# Patient Record
Sex: FEMALE | Race: WHITE | Employment: OTHER | URBAN - METROPOLITAN AREA
[De-identification: names, ages, dates, MRNs, and addresses within clinical notes are randomized per-mention and may not be internally consistent; named-entity substitution may affect disease eponyms.]

---

## 2017-02-10 ENCOUNTER — TELEPHONE (OUTPATIENT)
Dept: INTERNAL MEDICINE CLINIC | Facility: CLINIC | Age: 62
End: 2017-02-10

## 2017-02-10 ENCOUNTER — OFFICE VISIT (OUTPATIENT)
Dept: INTERNAL MEDICINE CLINIC | Facility: CLINIC | Age: 62
End: 2017-02-10

## 2017-02-10 VITALS
HEIGHT: 60 IN | DIASTOLIC BLOOD PRESSURE: 82 MMHG | SYSTOLIC BLOOD PRESSURE: 126 MMHG | HEART RATE: 106 BPM | TEMPERATURE: 98 F | BODY MASS INDEX: 29.18 KG/M2 | OXYGEN SATURATION: 98 % | WEIGHT: 148.63 LBS | RESPIRATION RATE: 16 BRPM

## 2017-02-10 DIAGNOSIS — R79.89 LOW VITAMIN D LEVEL: ICD-10-CM

## 2017-02-10 DIAGNOSIS — Z99.89 OSA ON CPAP: ICD-10-CM

## 2017-02-10 DIAGNOSIS — G47.33 OSA (OBSTRUCTIVE SLEEP APNEA): Primary | ICD-10-CM

## 2017-02-10 DIAGNOSIS — Z12.31 ENCOUNTER FOR SCREENING MAMMOGRAM FOR BREAST CANCER: ICD-10-CM

## 2017-02-10 DIAGNOSIS — Z12.4 ENCOUNTER FOR SCREENING FOR CERVICAL CANCER: ICD-10-CM

## 2017-02-10 DIAGNOSIS — G47.33 OSA ON CPAP: ICD-10-CM

## 2017-02-10 DIAGNOSIS — E78.2 MIXED HYPERLIPIDEMIA: ICD-10-CM

## 2017-02-10 DIAGNOSIS — I69.30 HISTORY OF CVA WITH RESIDUAL DEFICIT: ICD-10-CM

## 2017-02-10 DIAGNOSIS — I10 ESSENTIAL HYPERTENSION: ICD-10-CM

## 2017-02-10 DIAGNOSIS — R73.03 PRE-DIABETES: ICD-10-CM

## 2017-02-10 DIAGNOSIS — Z00.00 ROUTINE GENERAL MEDICAL EXAMINATION AT A HEALTH CARE FACILITY: Primary | ICD-10-CM

## 2017-02-10 PROBLEM — Z86.32 HISTORY OF GESTATIONAL DIABETES MELLITUS (GDM): Status: ACTIVE | Noted: 2017-02-10

## 2017-02-10 PROCEDURE — 99214 OFFICE O/P EST MOD 30 MIN: CPT | Performed by: INTERNAL MEDICINE

## 2017-02-10 PROCEDURE — 99386 PREV VISIT NEW AGE 40-64: CPT | Performed by: INTERNAL MEDICINE

## 2017-02-10 PROCEDURE — 87624 HPV HI-RISK TYP POOLED RSLT: CPT | Performed by: INTERNAL MEDICINE

## 2017-02-10 PROCEDURE — 88175 CYTOPATH C/V AUTO FLUID REDO: CPT | Performed by: INTERNAL MEDICINE

## 2017-02-10 RX ORDER — DIAZEPAM 5 MG/1
5 TABLET ORAL EVERY 6 HOURS PRN
Qty: 30 TABLET | Refills: 0 | Status: SHIPPED | OUTPATIENT
Start: 2017-02-10 | End: 2018-11-20

## 2017-02-10 RX ORDER — ZOLPIDEM TARTRATE 5 MG/1
5 TABLET ORAL NIGHTLY PRN
Qty: 30 TABLET | Refills: 0 | Status: SHIPPED | OUTPATIENT
Start: 2017-02-10 | End: 2017-12-08

## 2017-02-10 RX ORDER — ROSUVASTATIN CALCIUM 10 MG/1
10 TABLET, COATED ORAL DAILY
Qty: 90 TABLET | Refills: 3 | Status: SHIPPED | OUTPATIENT
Start: 2017-02-10 | End: 2018-06-18

## 2017-02-10 RX ORDER — DOXEPIN HYDROCHLORIDE 50 MG/1
1 CAPSULE ORAL DAILY
COMMUNITY

## 2017-02-10 RX ORDER — VALSARTAN 80 MG/1
80 TABLET ORAL DAILY
Qty: 90 TABLET | Refills: 3 | Status: SHIPPED | OUTPATIENT
Start: 2017-02-10 | End: 2018-06-18

## 2017-02-10 NOTE — TELEPHONE ENCOUNTER
Per Dr. Justice Olivas. Pt needs CPAP equipment ordered from [facility] insurance, brought SS records w/her.

## 2017-02-10 NOTE — PROGRESS NOTES
HPI:   Michelle Recinos is a 62year old female who presents for a complete physical exam. .    HTN  Hypercholesterolemia    Reviewed records in [facility] and those she brought w/her from [facility]    She had been previously been going to [facility] and has a home MG Oral Tab Take 10 mg by mouth daily. Disp:  Rfl:    Fluticasone Furoate (VERAMYST) 27.5 MCG/SPRAY Nasal Suspension 2 sprays by Nasal route as needed. Disp:  Rfl:    Levocetirizine Dihydrochloride (XYZAL) 5 MG Oral Tab Take 5 mg by mouth every evening. mmHg  Pulse 106  Temp(Src) 97.9 °F (36.6 °C) (Oral)  Resp 16  Ht 60\"  Wt 148 lb 9.6 oz  BMI 29.02 kg/m2  SpO2 98%  Body mass index is 29.02 kg/(m^2).    GENERAL: well developed, well nourished,in no apparent distress  SKIN: no rashes,no suspicious lesions now  Essential hypertension- controlled, check labs and refill meds  History of cva with residual deficit- RF control, she had been on ERT just prior to her event so may have been a hypercoag state, no other etiology found for the event.  She does have some

## 2017-02-11 ENCOUNTER — MED REC SCAN ONLY (OUTPATIENT)
Dept: INTERNAL MEDICINE CLINIC | Facility: CLINIC | Age: 62
End: 2017-02-11

## 2017-02-13 ENCOUNTER — LAB ENCOUNTER (OUTPATIENT)
Dept: LAB | Age: 62
End: 2017-02-13
Attending: INTERNAL MEDICINE
Payer: COMMERCIAL

## 2017-02-13 DIAGNOSIS — E55.9 AVITAMINOSIS D: Primary | ICD-10-CM

## 2017-02-13 DIAGNOSIS — R73.03 PRE-DIABETES: ICD-10-CM

## 2017-02-13 DIAGNOSIS — Z00.00 ROUTINE GENERAL MEDICAL EXAMINATION AT A HEALTH CARE FACILITY: ICD-10-CM

## 2017-02-13 LAB
25-HYDROXYVITAMIN D (TOTAL): 32.1 NG/ML (ref 30–100)
ALBUMIN SERPL-MCNC: 4.1 G/DL (ref 3.5–4.8)
ALP LIVER SERPL-CCNC: 62 U/L (ref 46–118)
ALT SERPL-CCNC: 35 U/L (ref 14–54)
AST SERPL-CCNC: 20 U/L (ref 15–41)
BASOPHILS # BLD AUTO: 0.05 X10(3) UL (ref 0–0.1)
BASOPHILS NFR BLD AUTO: 0.9 %
BILIRUB SERPL-MCNC: 0.5 MG/DL (ref 0.1–2)
BUN BLD-MCNC: 12 MG/DL (ref 8–20)
CALCIUM BLD-MCNC: 8.9 MG/DL (ref 8.3–10.3)
CHLORIDE: 102 MMOL/L (ref 101–111)
CHOLEST SMN-MCNC: 195 MG/DL (ref ?–200)
CO2: 30 MMOL/L (ref 22–32)
CREAT BLD-MCNC: 0.75 MG/DL (ref 0.55–1.02)
EOSINOPHIL # BLD AUTO: 0.21 X10(3) UL (ref 0–0.3)
EOSINOPHIL NFR BLD AUTO: 3.7 %
ERYTHROCYTE [DISTWIDTH] IN BLOOD BY AUTOMATED COUNT: 12.4 % (ref 11.5–16)
EST. AVERAGE GLUCOSE BLD GHB EST-MCNC: 137 MG/DL (ref 68–126)
GLUCOSE BLD-MCNC: 108 MG/DL (ref 70–99)
HBA1C MFR BLD HPLC: 6.4 % (ref ?–5.7)
HCT VFR BLD AUTO: 41.4 % (ref 34–50)
HDLC SERPL-MCNC: 68 MG/DL (ref 45–?)
HDLC SERPL: 2.87 {RATIO} (ref ?–4.44)
HGB BLD-MCNC: 13.3 G/DL (ref 12–16)
IMMATURE GRANULOCYTE COUNT: 0.02 X10(3) UL (ref 0–1)
IMMATURE GRANULOCYTE RATIO %: 0.4 %
LDLC SERPL CALC-MCNC: 89 MG/DL (ref ?–130)
LYMPHOCYTES # BLD AUTO: 1.82 X10(3) UL (ref 0.9–4)
LYMPHOCYTES NFR BLD AUTO: 31.9 %
M PROTEIN MFR SERPL ELPH: 7.6 G/DL (ref 6.1–8.3)
MCH RBC QN AUTO: 29.8 PG (ref 27–33.2)
MCHC RBC AUTO-ENTMCNC: 32.1 G/DL (ref 31–37)
MCV RBC AUTO: 92.6 FL (ref 81–100)
MONOCYTES # BLD AUTO: 0.49 X10(3) UL (ref 0.1–0.6)
MONOCYTES NFR BLD AUTO: 8.6 %
NEUTROPHIL ABS PRELIM: 3.11 X10 (3) UL (ref 1.3–6.7)
NEUTROPHILS # BLD AUTO: 3.11 X10(3) UL (ref 1.3–6.7)
NEUTROPHILS NFR BLD AUTO: 54.5 %
NONHDLC SERPL-MCNC: 127 MG/DL (ref ?–130)
PLATELET # BLD AUTO: 263 10(3)UL (ref 150–450)
POTASSIUM SERPL-SCNC: 4.4 MMOL/L (ref 3.6–5.1)
RBC # BLD AUTO: 4.47 X10(6)UL (ref 3.8–5.1)
RED CELL DISTRIBUTION WIDTH-SD: 42.1 FL (ref 35.1–46.3)
SODIUM SERPL-SCNC: 139 MMOL/L (ref 136–144)
TRIGLYCERIDES: 188 MG/DL (ref ?–150)
TSI SER-ACNC: 1.69 MIU/ML (ref 0.35–5.5)
VLDL: 38 MG/DL (ref 5–40)
WBC # BLD AUTO: 5.7 X10(3) UL (ref 4–13)

## 2017-02-13 PROCEDURE — 84443 ASSAY THYROID STIM HORMONE: CPT | Performed by: INTERNAL MEDICINE

## 2017-02-13 PROCEDURE — 85025 COMPLETE CBC W/AUTO DIFF WBC: CPT | Performed by: INTERNAL MEDICINE

## 2017-02-13 PROCEDURE — 83036 HEMOGLOBIN GLYCOSYLATED A1C: CPT | Performed by: INTERNAL MEDICINE

## 2017-02-13 PROCEDURE — 80061 LIPID PANEL: CPT | Performed by: INTERNAL MEDICINE

## 2017-02-13 PROCEDURE — 36415 COLL VENOUS BLD VENIPUNCTURE: CPT | Performed by: INTERNAL MEDICINE

## 2017-02-13 PROCEDURE — 80053 COMPREHEN METABOLIC PANEL: CPT | Performed by: INTERNAL MEDICINE

## 2017-02-13 PROCEDURE — 82306 VITAMIN D 25 HYDROXY: CPT | Performed by: INTERNAL MEDICINE

## 2017-02-16 LAB — HPV I/H RISK 1 DNA SPEC QL NAA+PROBE: NEGATIVE

## 2017-04-24 ENCOUNTER — OFFICE VISIT (OUTPATIENT)
Dept: INTERNAL MEDICINE CLINIC | Facility: CLINIC | Age: 62
End: 2017-04-24

## 2017-04-24 VITALS
SYSTOLIC BLOOD PRESSURE: 126 MMHG | WEIGHT: 146.88 LBS | BODY MASS INDEX: 28.83 KG/M2 | HEART RATE: 98 BPM | TEMPERATURE: 98 F | HEIGHT: 60 IN | RESPIRATION RATE: 16 BRPM | OXYGEN SATURATION: 98 % | DIASTOLIC BLOOD PRESSURE: 82 MMHG

## 2017-04-24 DIAGNOSIS — R73.03 PRE-DIABETES: Primary | ICD-10-CM

## 2017-04-24 DIAGNOSIS — S63.635A SPRAIN OF INTERPHALANGEAL JOINT OF LEFT RING FINGER, INITIAL ENCOUNTER: ICD-10-CM

## 2017-04-24 DIAGNOSIS — L98.9 SKIN LESIONS: ICD-10-CM

## 2017-04-24 DIAGNOSIS — R49.0 HOARSENESS: ICD-10-CM

## 2017-04-24 PROCEDURE — 99214 OFFICE O/P EST MOD 30 MIN: CPT | Performed by: INTERNAL MEDICINE

## 2017-04-24 NOTE — PROGRESS NOTES
Lurdes Thomas is a 62year old female.  To F/U from last visit regarding pre-diabetes    HPI:    Interim history:she has a new fitbit and walking 3-5 miles daily, has cut out soda and candy and low carbs and she is feeling well  She does not check her o vitamin D level     Pre-diabetes     History of CVA with residual deficit     History of gestational diabetes mellitus (GDM)        REVIEW OF SYSTEMS:   GENERAL HEALTH: feels well otherwise      EXAM:   /82 mmHg  Pulse 98  Temp(Src) 98 °F (36.7 °C) ( Lymphocyte Absolute 1.82 0.90-4.00 x10(3) uL   Monocyte Absolute 0.49 0.10-0.60 x10(3) uL   Eosinophil Absolute 0.21 0.00-0.30 x10(3) uL   Basophil Absolute 0.05 0.00-0.10 x10(3) uL   Immature Granulocyte Absolute 0.02 0.00-1.00 x10(3) uL   Neutrophil %

## 2017-04-27 ENCOUNTER — HOSPITAL ENCOUNTER (OUTPATIENT)
Dept: MAMMOGRAPHY | Age: 62
Discharge: HOME | End: 2017-04-27
Attending: INTERNAL MEDICINE
Payer: COMMERCIAL

## 2017-04-27 DIAGNOSIS — Z12.31 ENCOUNTER FOR SCREENING MAMMOGRAM FOR BREAST CANCER: ICD-10-CM

## 2017-04-27 PROCEDURE — 77067 SCR MAMMO BI INCL CAD: CPT

## 2017-05-02 ENCOUNTER — TELEPHONE (OUTPATIENT)
Dept: INTERNAL MEDICINE CLINIC | Facility: CLINIC | Age: 62
End: 2017-05-02

## 2017-06-27 ENCOUNTER — OFFICE VISIT (OUTPATIENT)
Dept: INTERNAL MEDICINE CLINIC | Facility: CLINIC | Age: 62
End: 2017-06-27

## 2017-06-27 VITALS
TEMPERATURE: 98 F | HEIGHT: 60 IN | WEIGHT: 149.81 LBS | HEART RATE: 102 BPM | DIASTOLIC BLOOD PRESSURE: 66 MMHG | BODY MASS INDEX: 29.41 KG/M2 | OXYGEN SATURATION: 97 % | SYSTOLIC BLOOD PRESSURE: 112 MMHG | RESPIRATION RATE: 16 BRPM

## 2017-06-27 DIAGNOSIS — N30.00 ACUTE CYSTITIS WITHOUT HEMATURIA: ICD-10-CM

## 2017-06-27 DIAGNOSIS — R39.9 UTI SYMPTOMS: Primary | ICD-10-CM

## 2017-06-27 PROCEDURE — 87086 URINE CULTURE/COLONY COUNT: CPT | Performed by: INTERNAL MEDICINE

## 2017-06-27 PROCEDURE — 99213 OFFICE O/P EST LOW 20 MIN: CPT | Performed by: INTERNAL MEDICINE

## 2017-06-27 PROCEDURE — 81003 URINALYSIS AUTO W/O SCOPE: CPT | Performed by: INTERNAL MEDICINE

## 2017-06-27 PROCEDURE — 87186 SC STD MICRODIL/AGAR DIL: CPT | Performed by: INTERNAL MEDICINE

## 2017-06-27 PROCEDURE — 87077 CULTURE AEROBIC IDENTIFY: CPT | Performed by: INTERNAL MEDICINE

## 2017-06-27 RX ORDER — NITROFURANTOIN 25; 75 MG/1; MG/1
100 CAPSULE ORAL 2 TIMES DAILY
Qty: 14 CAPSULE | Refills: 0 | Status: SHIPPED | OUTPATIENT
Start: 2017-06-27 | End: 2017-06-29

## 2017-06-27 NOTE — PROGRESS NOTES
Sola Russo is a 62year old female  Patient presents with:  UTI: Burning, Frequency, Back Pain      HPI:   2 days ago dysuria and pelvic urgency and LBP and slight headache, no rigors or fever, no blood     Current Outpatient Prescriptions:  Nitrofu vitamin D level     Pre-diabetes     History of CVA with residual deficit     History of gestational diabetes mellitus (GDM)        REVIEW OF SYSTEMS:   GENERAL HEALTH: feels well otherwise  SKIN: denies any unusual skin lesions or rashes  RESPIRATORY: den mouth 2 (two) times daily. Imaging & Consults:  None    No Follow-up on file. There are no Patient Instructions on file for this visit. The patient indicates understanding of these issues and agrees to the plan.

## 2017-06-29 RX ORDER — SULFAMETHOXAZOLE AND TRIMETHOPRIM 800; 160 MG/1; MG/1
1 TABLET ORAL 2 TIMES DAILY
Qty: 14 TABLET | Refills: 0 | Status: SHIPPED | OUTPATIENT
Start: 2017-06-29 | End: 2020-02-10

## 2017-08-15 ENCOUNTER — OFFICE VISIT (OUTPATIENT)
Dept: INTERNAL MEDICINE CLINIC | Facility: CLINIC | Age: 62
End: 2017-08-15

## 2017-08-15 ENCOUNTER — LABORATORY ENCOUNTER (OUTPATIENT)
Dept: LAB | Age: 62
End: 2017-08-15
Attending: INTERNAL MEDICINE
Payer: COMMERCIAL

## 2017-08-15 VITALS
RESPIRATION RATE: 16 BRPM | DIASTOLIC BLOOD PRESSURE: 62 MMHG | SYSTOLIC BLOOD PRESSURE: 118 MMHG | WEIGHT: 147 LBS | HEIGHT: 60 IN | OXYGEN SATURATION: 99 % | TEMPERATURE: 98 F | BODY MASS INDEX: 28.86 KG/M2 | HEART RATE: 98 BPM

## 2017-08-15 DIAGNOSIS — R73.03 PRE-DIABETES: ICD-10-CM

## 2017-08-15 DIAGNOSIS — R39.9 UTI SYMPTOMS: Primary | ICD-10-CM

## 2017-08-15 LAB
APPEARANCE: CLEAR
BILIRUBIN: NEGATIVE
EST. AVERAGE GLUCOSE BLD GHB EST-MCNC: 134 MG/DL (ref 68–126)
GLUCOSE (URINE DIPSTICK): NEGATIVE MG/DL
HBA1C MFR BLD HPLC: 6.3 % (ref ?–5.7)
KETONES (URINE DIPSTICK): NEGATIVE MG/DL
LEUKOCYTES: NEGATIVE
MULTISTIX LOT#: NORMAL NUMERIC
NITRITE, URINE: NEGATIVE
PH, URINE: 6 (ref 4.5–8)
PROTEIN (URINE DIPSTICK): NEGATIVE MG/DL
SPECIFIC GRAVITY: <=1.005 (ref 1–1.03)
URINE-COLOR: YELLOW
UROBILINOGEN,SEMI-QN: 0.2 MG/DL (ref 0–1.9)

## 2017-08-15 PROCEDURE — 81003 URINALYSIS AUTO W/O SCOPE: CPT | Performed by: INTERNAL MEDICINE

## 2017-08-15 PROCEDURE — 99213 OFFICE O/P EST LOW 20 MIN: CPT | Performed by: INTERNAL MEDICINE

## 2017-08-15 PROCEDURE — 83036 HEMOGLOBIN GLYCOSYLATED A1C: CPT

## 2017-08-15 PROCEDURE — 87086 URINE CULTURE/COLONY COUNT: CPT | Performed by: INTERNAL MEDICINE

## 2017-08-15 PROCEDURE — 36415 COLL VENOUS BLD VENIPUNCTURE: CPT

## 2017-08-15 RX ORDER — SULFAMETHOXAZOLE AND TRIMETHOPRIM 800; 160 MG/1; MG/1
1 TABLET ORAL 2 TIMES DAILY
Qty: 14 TABLET | Refills: 0 | Status: SHIPPED | OUTPATIENT
Start: 2017-08-15 | End: 2018-06-18 | Stop reason: ALTCHOICE

## 2017-08-15 NOTE — PROGRESS NOTES
Larry Richardson is a 65year old female  Patient presents with:  UTI: Symptoms      HPI:   She had a cutlure -confirmed UTI in June, first one in years  Now 1 week w/urgency and burning after she urinated, no blood, no fever, flank pain, chills . slight l Smokeless tobacco: Never Used                      Alcohol use: Yes           0.0 oz/week     Comment: 3x per week     Patient Active Problem List:     HTN (hypertension)     Hyperlipidemia     YUSEF on CPAP on file for this visit. The patient indicates understanding of these issues and agrees to the plan.

## 2017-08-17 ENCOUNTER — TELEPHONE (OUTPATIENT)
Dept: INTERNAL MEDICINE CLINIC | Facility: CLINIC | Age: 62
End: 2017-08-17

## 2017-08-17 DIAGNOSIS — R39.15 URINARY URGENCY: Primary | ICD-10-CM

## 2017-08-17 DIAGNOSIS — R30.0 DYSURIA: ICD-10-CM

## 2017-08-17 NOTE — TELEPHONE ENCOUNTER
[address] called asking for an authorization for CPAP supplies. I told them we have not received a request since May.

## 2017-08-17 NOTE — PROGRESS NOTES
Spoke to pt, aware of results. She states that she is still feeling irritation. Please advise.     Pt would like to be called back at [phone]

## 2017-08-23 NOTE — TELEPHONE ENCOUNTER
Medical records request rec'd from [facility] for records dated 8-14-17 to 11-13-17. Forwarded to [employer] 8-23-17.   See med rec scan dated 8-23-17

## 2017-08-23 NOTE — TELEPHONE ENCOUNTER
Incoming (mail or fax): Fax  [facility]  [facility]  Documentation given to:  [id number] fax bin.

## 2017-09-11 ENCOUNTER — TELEPHONE (OUTPATIENT)
Dept: INTERNAL MEDICINE CLINIC | Facility: CLINIC | Age: 62
End: 2017-09-11

## 2017-09-11 DIAGNOSIS — Z99.89 OSA ON CPAP: ICD-10-CM

## 2017-09-11 DIAGNOSIS — G47.33 OBSTRUCTIVE SLEEP APNEA SYNDROME: Primary | ICD-10-CM

## 2017-09-11 DIAGNOSIS — G47.33 OSA ON CPAP: ICD-10-CM

## 2017-09-11 NOTE — TELEPHONE ENCOUNTER
Last DME referral/order was completed by you for CPAP machine and supplies on 2/13/17. Pt renting CPAP machine, due for new renewal (retro) for rental dates of 8/24/17 to 2/23/18. To be faxed to [phone]. Routed to Dr Abbie Cordero for review.

## 2017-09-26 NOTE — TELEPHONE ENCOUNTER
Call received by [employer] New[employer] needing auth number. Was not sent as DME, resending as DME. Order had already been signed by Dr Elham Yin on 9/8/17 and faxed to [facility] Newell at that time.

## 2017-10-02 NOTE — TELEPHONE ENCOUNTER
Spoke to Laurel at [facility], she needs the following answered to process referral.  1. How old is current cpap machine? 2. What's the need for a new cpap machine? 3. Can old machine be repaired?     This information can be added to the referral notes when o

## 2017-10-05 NOTE — TELEPHONE ENCOUNTER
Jose Guadalupe Culp from [facility] called back regarding below information, Jose Guadalupe Culp wanted to ONLY speak with Darryle Falter, please advise, Jose Guadalupe Culp contact phone #[phone]. Thank you!

## 2017-10-05 NOTE — TELEPHONE ENCOUNTER
Pt called back:  1. 6 months old  2. Nothing is wrong with the machine, her rental agreement says she gets a new machine every 6 months  3. The machine is not broken. Pt states she does not want a new machine at this time. Referral updated.

## 2017-12-08 ENCOUNTER — TELEPHONE (OUTPATIENT)
Dept: INTERNAL MEDICINE CLINIC | Facility: CLINIC | Age: 62
End: 2017-12-08

## 2017-12-08 NOTE — TELEPHONE ENCOUNTER
From: Hazel Patterson  Sent: 12/8/2017 3:12 PM CST  Subject: Medication Renewal Request    Mariahpaul GRADYJoby Thapa would like a refill of the following medications:     Zolpidem Tartrate 5 MG Oral Tab Jac Gaines MD]    Preferred pharmacy[facility]

## 2017-12-11 RX ORDER — ZOLPIDEM TARTRATE 5 MG/1
5 TABLET ORAL NIGHTLY PRN
Qty: 30 TABLET | Refills: 0 | Status: SHIPPED
Start: 2017-12-11 | End: 2018-11-20

## 2017-12-11 NOTE — TELEPHONE ENCOUNTER
Last OV relevant to medication: 2/10/2017 - PE, 4/24/2017 - Med chk, Lab results  Last refill date: 2/10/2017     #/refills: 30/0  When pt was asked to return for OV: 1yr - PE, 6 months -BP & Chol  Upcoming appt/reason: none

## 2018-06-18 ENCOUNTER — HOSPITAL ENCOUNTER (OUTPATIENT)
Dept: GENERAL RADIOLOGY | Age: 63
Discharge: HOME | End: 2018-06-18
Attending: INTERNAL MEDICINE
Payer: COMMERCIAL

## 2018-06-18 ENCOUNTER — OFFICE VISIT (OUTPATIENT)
Dept: INTERNAL MEDICINE CLINIC | Facility: CLINIC | Age: 63
End: 2018-06-18

## 2018-06-18 VITALS
SYSTOLIC BLOOD PRESSURE: 124 MMHG | WEIGHT: 146.69 LBS | RESPIRATION RATE: 14 BRPM | DIASTOLIC BLOOD PRESSURE: 84 MMHG | BODY MASS INDEX: 28.42 KG/M2 | OXYGEN SATURATION: 96 % | TEMPERATURE: 99 F | HEART RATE: 109 BPM | HEIGHT: 60.43 IN

## 2018-06-18 DIAGNOSIS — R73.03 PRE-DIABETES: ICD-10-CM

## 2018-06-18 DIAGNOSIS — Z12.31 ENCOUNTER FOR SCREENING MAMMOGRAM FOR BREAST CANCER: Primary | ICD-10-CM

## 2018-06-18 DIAGNOSIS — Z00.00 ROUTINE GENERAL MEDICAL EXAMINATION AT A HEALTH CARE FACILITY: ICD-10-CM

## 2018-06-18 DIAGNOSIS — M79.642 PAIN IN BOTH HANDS: ICD-10-CM

## 2018-06-18 DIAGNOSIS — M79.641 PAIN IN BOTH HANDS: ICD-10-CM

## 2018-06-18 DIAGNOSIS — Z86.32 HISTORY OF GESTATIONAL DIABETES MELLITUS (GDM): ICD-10-CM

## 2018-06-18 DIAGNOSIS — R22.1 NECK MASS: ICD-10-CM

## 2018-06-18 PROCEDURE — 73130 X-RAY EXAM OF HAND: CPT | Performed by: INTERNAL MEDICINE

## 2018-06-18 PROCEDURE — 99396 PREV VISIT EST AGE 40-64: CPT | Performed by: INTERNAL MEDICINE

## 2018-06-18 RX ORDER — LEVOCETIRIZINE DIHYDROCHLORIDE 5 MG/1
5 TABLET, FILM COATED ORAL EVERY EVENING
Qty: 90 TABLET | Refills: 3 | Status: SHIPPED | OUTPATIENT
Start: 2018-06-18 | End: 2019-11-05

## 2018-06-18 RX ORDER — VALSARTAN 80 MG/1
80 TABLET ORAL DAILY
Qty: 90 TABLET | Refills: 3 | Status: SHIPPED | OUTPATIENT
Start: 2018-06-18 | End: 2018-11-20

## 2018-06-18 RX ORDER — ROSUVASTATIN CALCIUM 10 MG/1
10 TABLET, COATED ORAL DAILY
Qty: 90 TABLET | Refills: 3 | Status: SHIPPED | OUTPATIENT
Start: 2018-06-18 | End: 2019-11-05

## 2018-06-18 NOTE — PROGRESS NOTES
HPI:   Romayne Bimler is a 65year old female who presents for a complete physical exam. . HTN  Hypercholesterolemia  Pre-diabetes    Reviewed records in [facility] and those she brought w/her from [facility]    .  Dr Octavio Rose has a comprehensive review of ev tablet by mouth daily. Disp:  Rfl:    diazepam 5 MG Oral Tab Take 1 tablet (5 mg total) by mouth every 6 (six) hours as needed for Anxiety. Disp: 30 tablet Rfl: 0   loratadine (CLARITIN) 10 MG Oral Tab Take 10 mg by mouth daily.  Disp:  Rfl:    Fluticasone depression or anxiety  HEMATOLOGIC: denies hx of anemia  ENDOCRINE: denies thyroid history  ALL/ASTHMA: denies hx of allergy or asthma    EXAM:   /84 (BP Location: Left arm, Patient Position: Sitting, Cuff Size: adult)   Pulse 109   Temp 98.9 °F (37. level- check level, encouraged her to take her Dk, she has very mild osteopenia  Mixed hyperlipidemia- on statin,TGs in 300s, explained and educated pt that this number does not need pharma treatment but rather is a \"marker\" for metabolic health, recheck

## 2018-06-22 ENCOUNTER — APPOINTMENT (OUTPATIENT)
Dept: LAB | Age: 63
End: 2018-06-22
Attending: INTERNAL MEDICINE
Payer: COMMERCIAL

## 2018-06-22 DIAGNOSIS — Z00.00 ROUTINE GENERAL MEDICAL EXAMINATION AT A HEALTH CARE FACILITY: ICD-10-CM

## 2018-06-22 PROCEDURE — 83036 HEMOGLOBIN GLYCOSYLATED A1C: CPT | Performed by: INTERNAL MEDICINE

## 2018-06-22 PROCEDURE — 80061 LIPID PANEL: CPT | Performed by: INTERNAL MEDICINE

## 2018-06-22 PROCEDURE — 82306 VITAMIN D 25 HYDROXY: CPT | Performed by: INTERNAL MEDICINE

## 2018-06-22 PROCEDURE — 36415 COLL VENOUS BLD VENIPUNCTURE: CPT | Performed by: INTERNAL MEDICINE

## 2018-06-22 PROCEDURE — 80053 COMPREHEN METABOLIC PANEL: CPT | Performed by: INTERNAL MEDICINE

## 2018-06-25 DIAGNOSIS — E66.3 PATIENT OVERWEIGHT: Primary | ICD-10-CM

## 2018-06-25 DIAGNOSIS — R73.03 PRE-DIABETES: ICD-10-CM

## 2018-06-28 ENCOUNTER — TELEPHONE (OUTPATIENT)
Dept: INTERNAL MEDICINE CLINIC | Facility: CLINIC | Age: 63
End: 2018-06-28

## 2018-06-28 DIAGNOSIS — R22.1 NECK MASS: Primary | ICD-10-CM

## 2018-06-28 NOTE — TELEPHONE ENCOUNTER
Alessandro Layne from [place] [address] called (ext. [phone]) regarding the order for the CT Soft Tissue of the Neck for a neck mass. They recommend the CT be done with contrast. Is this okay?  Order pended and I associated the diagnosis, but it needs a reason for the exam. Uns

## 2018-07-10 ENCOUNTER — HOSPITAL ENCOUNTER (OUTPATIENT)
Dept: MAMMOGRAPHY | Age: 63
Discharge: HOME | End: 2018-07-10
Attending: INTERNAL MEDICINE
Payer: COMMERCIAL

## 2018-07-10 ENCOUNTER — HOSPITAL ENCOUNTER (OUTPATIENT)
Dept: CT IMAGING | Age: 63
Discharge: HOME | End: 2018-07-10
Attending: INTERNAL MEDICINE
Payer: COMMERCIAL

## 2018-07-10 DIAGNOSIS — R22.1 NECK MASS: ICD-10-CM

## 2018-07-10 DIAGNOSIS — Z12.31 ENCOUNTER FOR SCREENING MAMMOGRAM FOR BREAST CANCER: ICD-10-CM

## 2018-07-10 LAB — CREAT SERPL-MCNC: 0.7 MG/DL (ref 0.55–1.02)

## 2018-07-10 PROCEDURE — 70491 CT SOFT TISSUE NECK W/DYE: CPT | Performed by: INTERNAL MEDICINE

## 2018-07-10 PROCEDURE — 77067 SCR MAMMO BI INCL CAD: CPT | Performed by: INTERNAL MEDICINE

## 2018-07-10 PROCEDURE — 82565 ASSAY OF CREATININE: CPT

## 2018-07-10 PROCEDURE — 77063 BREAST TOMOSYNTHESIS BI: CPT | Performed by: INTERNAL MEDICINE

## 2018-07-17 ENCOUNTER — LAB ENCOUNTER (OUTPATIENT)
Dept: LAB | Age: 63
End: 2018-07-17
Attending: INTERNAL MEDICINE
Payer: COMMERCIAL

## 2018-07-17 ENCOUNTER — OFFICE VISIT (OUTPATIENT)
Dept: INTERNAL MEDICINE CLINIC | Facility: CLINIC | Age: 63
End: 2018-07-17
Payer: COMMERCIAL

## 2018-07-17 VITALS
SYSTOLIC BLOOD PRESSURE: 118 MMHG | HEART RATE: 83 BPM | WEIGHT: 147 LBS | HEIGHT: 61 IN | DIASTOLIC BLOOD PRESSURE: 70 MMHG | RESPIRATION RATE: 16 BRPM | OXYGEN SATURATION: 97 % | BODY MASS INDEX: 27.75 KG/M2 | TEMPERATURE: 98 F

## 2018-07-17 DIAGNOSIS — M47.892 OTHER OSTEOARTHRITIS OF SPINE, CERVICAL REGION: Primary | ICD-10-CM

## 2018-07-17 DIAGNOSIS — L67.8 BRITTLE HAIR: ICD-10-CM

## 2018-07-17 DIAGNOSIS — I10 ESSENTIAL HYPERTENSION: ICD-10-CM

## 2018-07-17 DIAGNOSIS — Z00.00 ROUTINE GENERAL MEDICAL EXAMINATION AT A HEALTH CARE FACILITY: ICD-10-CM

## 2018-07-17 DIAGNOSIS — M19.049 HAND ARTHRITIS: ICD-10-CM

## 2018-07-17 PROBLEM — M47.9 SPONDYLARTHROSIS: Status: ACTIVE | Noted: 2018-07-17

## 2018-07-17 LAB
BASOPHILS # BLD AUTO: 0.07 X10(3) UL (ref 0–0.1)
BASOPHILS NFR BLD AUTO: 1.1 %
EOSINOPHIL # BLD AUTO: 0.21 X10(3) UL (ref 0–0.3)
EOSINOPHIL NFR BLD AUTO: 3.2 %
ERYTHROCYTE [DISTWIDTH] IN BLOOD BY AUTOMATED COUNT: 12.6 % (ref 11.5–16)
FREE T4: 0.8 NG/DL (ref 0.9–1.8)
HCT VFR BLD AUTO: 41.7 % (ref 34–50)
HGB BLD-MCNC: 13 G/DL (ref 12–16)
IMMATURE GRANULOCYTE COUNT: 0.01 X10(3) UL (ref 0–1)
IMMATURE GRANULOCYTE RATIO %: 0.2 %
LYMPHOCYTES # BLD AUTO: 1.97 X10(3) UL (ref 0.9–4)
LYMPHOCYTES NFR BLD AUTO: 29.6 %
MCH RBC QN AUTO: 28.9 PG (ref 27–33.2)
MCHC RBC AUTO-ENTMCNC: 31.2 G/DL (ref 31–37)
MCV RBC AUTO: 92.7 FL (ref 81–100)
MONOCYTES # BLD AUTO: 0.64 X10(3) UL (ref 0.1–1)
MONOCYTES NFR BLD AUTO: 9.6 %
NEUTROPHIL ABS PRELIM: 3.76 X10 (3) UL (ref 1.3–6.7)
NEUTROPHILS # BLD AUTO: 3.76 X10(3) UL (ref 1.3–6.7)
NEUTROPHILS NFR BLD AUTO: 56.3 %
PLATELET # BLD AUTO: 261 10(3)UL (ref 150–450)
RBC # BLD AUTO: 4.5 X10(6)UL (ref 3.8–5.1)
RED CELL DISTRIBUTION WIDTH-SD: 42.7 FL (ref 35.1–46.3)
TSI SER-ACNC: 1.94 MIU/ML (ref 0.35–5.5)
WBC # BLD AUTO: 6.7 X10(3) UL (ref 4–13)

## 2018-07-17 PROCEDURE — 84443 ASSAY THYROID STIM HORMONE: CPT | Performed by: INTERNAL MEDICINE

## 2018-07-17 PROCEDURE — 84439 ASSAY OF FREE THYROXINE: CPT | Performed by: INTERNAL MEDICINE

## 2018-07-17 PROCEDURE — 99213 OFFICE O/P EST LOW 20 MIN: CPT | Performed by: INTERNAL MEDICINE

## 2018-07-17 PROCEDURE — 85025 COMPLETE CBC W/AUTO DIFF WBC: CPT | Performed by: INTERNAL MEDICINE

## 2018-07-17 PROCEDURE — 36415 COLL VENOUS BLD VENIPUNCTURE: CPT | Performed by: INTERNAL MEDICINE

## 2018-07-17 RX ORDER — CYCLOBENZAPRINE HCL 10 MG
10 TABLET ORAL 3 TIMES DAILY
Qty: 30 TABLET | Refills: 1 | Status: SHIPPED | OUTPATIENT
Start: 2018-07-17 | End: 2018-08-06

## 2018-07-17 RX ORDER — LOSARTAN POTASSIUM 25 MG/1
25 TABLET ORAL DAILY
Qty: 90 TABLET | Refills: 3 | Status: SHIPPED | OUTPATIENT
Start: 2018-07-17 | End: 2018-11-20 | Stop reason: DRUGHIGH

## 2018-07-17 NOTE — PROGRESS NOTES
Marcelle Ybarra is a 59year old female  Patient presents with:  Neck Pain: patient c/o neck pain since january      HPI:   Pain is paracervical b/l with neck rotation for 6 mos, it awakens in the night and is worse in the morning.  It gets better as the (six) hours as needed for Anxiety. Disp: 30 tablet Rfl: 0   loratadine (CLARITIN) 10 MG Oral Tab Take 10 mg by mouth daily. Disp:  Rfl:    Fluticasone Furoate (VERAMYST) 27.5 MCG/SPRAY Nasal Suspension 2 sprays by Nasal route as needed.    Disp:  Rfl:    As lump, neck     TECHNIQUE:  IV contrast-enhanced multislice CT scanning is performed through the neck soft tissues during administration of nonionic contrast.  Dose reduction techniques were used.  Dose information is transmitted to the [facility] [employer] vein is in close proximity. It is patent. No mass is identified. A non encapsulated subcutaneous   lipoma could be occult. Continued clinical correlation recommended. 2.  Small right vertebral artery, similar to the prior MRA neck study.   3.  Details has pain to PIPJ in ring finger after a fall 2 years ago that has never fully healed. States   pain is the same in both hands.          =====  CONCLUSION:    No acute fracture, dislocation, deformity or other acute process.   There are degenerative changes

## 2018-07-30 ENCOUNTER — OFFICE VISIT (OUTPATIENT)
Dept: PHYSICAL THERAPY | Age: 63
End: 2018-07-30
Attending: INTERNAL MEDICINE
Payer: COMMERCIAL

## 2018-07-30 DIAGNOSIS — M47.892 OTHER OSTEOARTHRITIS OF SPINE, CERVICAL REGION: ICD-10-CM

## 2018-07-30 PROCEDURE — 97110 THERAPEUTIC EXERCISES: CPT

## 2018-07-30 PROCEDURE — 97161 PT EVAL LOW COMPLEX 20 MIN: CPT

## 2018-07-30 NOTE — PROGRESS NOTES
CERVICAL EVALUATION:   Referring Physician: Dr. Fadi Marquez  Diagnosis: OA of cervical region     Date of Service: 7/30/2018     PATIENT Kim Colvin is a 59year old y/o female who presents to therapy today with complaints of decreased ne is medically necessary for pt to continue PT to reach functional goals. Precautions:  Decreased right vertebral artery size. No cervical manipulation.      OBJECTIVE:   Special tests: Negative alar and transverse ligament testing, negative compression 56/100    Patient/Family/Caregiver was advised of these findings, precautions, and treatment options and has agreed to actively participate in planning and for this course of care.     Thank you for your referral. Please co-sign or sign and return this stephanie

## 2018-08-01 ENCOUNTER — OFFICE VISIT (OUTPATIENT)
Dept: PHYSICAL THERAPY | Age: 63
End: 2018-08-01
Attending: INTERNAL MEDICINE
Payer: COMMERCIAL

## 2018-08-01 DIAGNOSIS — M47.892 OTHER OSTEOARTHRITIS OF SPINE, CERVICAL REGION: ICD-10-CM

## 2018-08-01 PROCEDURE — 97110 THERAPEUTIC EXERCISES: CPT

## 2018-08-01 PROCEDURE — 97140 MANUAL THERAPY 1/> REGIONS: CPT

## 2018-08-01 NOTE — PROGRESS NOTES
Dx: OA of cervical region           Authorized # of Visits:  10         Next MD visit: none scheduled  Fall Risk: standard         Precautions: Decreased right vertebral artery size. No cervical manipulation.          Subjective: Pt states since the first all other levels.             Charges: Manual x2, Therex x1       Total Timed Treatment: 50 min  Total Treatment Time: 50 min

## 2018-08-07 ENCOUNTER — OFFICE VISIT (OUTPATIENT)
Dept: PHYSICAL THERAPY | Age: 63
End: 2018-08-07
Attending: INTERNAL MEDICINE
Payer: COMMERCIAL

## 2018-08-07 DIAGNOSIS — M47.892 OTHER OSTEOARTHRITIS OF SPINE, CERVICAL REGION: ICD-10-CM

## 2018-08-07 PROCEDURE — 97110 THERAPEUTIC EXERCISES: CPT

## 2018-08-07 PROCEDURE — 97140 MANUAL THERAPY 1/> REGIONS: CPT

## 2018-08-07 NOTE — PROGRESS NOTES
Dx: OA of cervical region           Authorized # of Visits:  10         Next MD visit: none scheduled  Fall Risk: standard         Precautions: Decreased right vertebral artery size. No cervical manipulation.          Subjective: Pt states her neck is feel standing 15x -        Towel rips for SA 10sx10 -        - -        Manual x25 min  UT STM  Cervical traction  OA dorsal glide  Facet gap all other levels. Manual x15 min  UT STM  Cervical traction  OA dorsal glide  Facet gap all other levels.            Ch

## 2018-08-09 ENCOUNTER — OFFICE VISIT (OUTPATIENT)
Dept: PHYSICAL THERAPY | Age: 63
End: 2018-08-09
Attending: INTERNAL MEDICINE
Payer: COMMERCIAL

## 2018-08-09 DIAGNOSIS — M47.892 OTHER OSTEOARTHRITIS OF SPINE, CERVICAL REGION: ICD-10-CM

## 2018-08-09 PROCEDURE — 97110 THERAPEUTIC EXERCISES: CPT

## 2018-08-09 PROCEDURE — 97140 MANUAL THERAPY 1/> REGIONS: CPT

## 2018-08-09 PROCEDURE — 97112 NEUROMUSCULAR REEDUCATION: CPT

## 2018-08-09 NOTE — PROGRESS NOTES
Dx: OA of cervical region           Authorized # of Visits:  10         Next MD visit: none scheduled  Fall Risk: standard         Precautions: Decreased right vertebral artery size. No cervical manipulation.          Subjective: Pt states her neck is feel book str 3sx10 ea standing Open book str 3sx10 ea standing Open book str 3sx10 ea standing       RTB ER 20x ea - Wall pushup 2x10       Neuro  90/90 ER wand standing 15x - Neuro  TRX T's 20x       Towel rips for SA 10sx10 - Towel rips for SA 10sx10       -

## 2018-08-13 ENCOUNTER — OFFICE VISIT (OUTPATIENT)
Dept: PHYSICAL THERAPY | Age: 63
End: 2018-08-13
Attending: INTERNAL MEDICINE
Payer: COMMERCIAL

## 2018-08-13 DIAGNOSIS — M47.892 OTHER OSTEOARTHRITIS OF SPINE, CERVICAL REGION: ICD-10-CM

## 2018-08-13 PROCEDURE — 97140 MANUAL THERAPY 1/> REGIONS: CPT

## 2018-08-13 PROCEDURE — 97112 NEUROMUSCULAR REEDUCATION: CPT

## 2018-08-13 PROCEDURE — 97110 THERAPEUTIC EXERCISES: CPT

## 2018-08-13 NOTE — PROGRESS NOTES
Dx: OA of cervical region           Authorized # of Visits:  10         Next MD visit: none scheduled  Fall Risk: standard         Precautions: Decreased right vertebral artery size. No cervical manipulation.          Subjective: Pt states her neck is feel ea - Wall pushup 2x10 Wall pushup 2x10      Neuro  90/90 ER wand standing 15x - Neuro  TRX T's 20x Neuro  TRX T's 20x      Towel rips for SA 10sx10 - Towel rips for SA 10sx10 Towel rips for SA 10sx10      - - Prone skydivers 2x10 Prone skydivers 2x10

## 2018-08-15 ENCOUNTER — OFFICE VISIT (OUTPATIENT)
Dept: PHYSICAL THERAPY | Age: 63
End: 2018-08-15
Attending: INTERNAL MEDICINE
Payer: COMMERCIAL

## 2018-08-15 DIAGNOSIS — M47.892 OTHER OSTEOARTHRITIS OF SPINE, CERVICAL REGION: ICD-10-CM

## 2018-08-15 PROCEDURE — 97110 THERAPEUTIC EXERCISES: CPT

## 2018-08-15 PROCEDURE — 97112 NEUROMUSCULAR REEDUCATION: CPT

## 2018-08-15 PROCEDURE — 97140 MANUAL THERAPY 1/> REGIONS: CPT

## 2018-08-15 NOTE — PROGRESS NOTES
Dx: OA of cervical region           Authorized # of Visits:  10         Next MD visit: none scheduled  Fall Risk: standard         Precautions: Decreased right vertebral artery size. No cervical manipulation.          Subjective: Pt states the left side of Open book str 3sx10 ea Open book str 3sx10 ea     RTB ER 20x ea - Wall pushup 2x10 Wall pushup 2x10 High table pushup 20x     Neuro  90/90 ER wand standing 15x - Neuro  TRX T's 20x Neuro  TRX T's 20x Neuro  TRX T's 20x     Towel rips for SA 10sx10 - Towel

## 2018-08-20 ENCOUNTER — OFFICE VISIT (OUTPATIENT)
Dept: PHYSICAL THERAPY | Age: 63
End: 2018-08-20
Attending: INTERNAL MEDICINE
Payer: COMMERCIAL

## 2018-08-20 DIAGNOSIS — M47.892 OTHER OSTEOARTHRITIS OF SPINE, CERVICAL REGION: ICD-10-CM

## 2018-08-20 PROCEDURE — 97110 THERAPEUTIC EXERCISES: CPT

## 2018-08-20 PROCEDURE — 97112 NEUROMUSCULAR REEDUCATION: CPT

## 2018-08-20 PROCEDURE — 97140 MANUAL THERAPY 1/> REGIONS: CPT

## 2018-08-20 NOTE — PROGRESS NOTES
Dx: OA of cervical region           Authorized # of Visits:  10         Next MD visit: none scheduled  Fall Risk: standard         Precautions: Decreased right vertebral artery size. No cervical manipulation.          Subjective: Pt states the left side of Green theratube rows 2x10    thoracic towel stretch 3 min thoracic towel stretch 3 min thoracic towel stretch 3 min - - Green band ER with scap squeeze 2x10    Open book str 3sx10 ea standing Open book str 3sx10 ea standing Open book str 3sx10 ea standing

## 2018-08-22 ENCOUNTER — OFFICE VISIT (OUTPATIENT)
Dept: PHYSICAL THERAPY | Age: 63
End: 2018-08-22
Attending: INTERNAL MEDICINE
Payer: COMMERCIAL

## 2018-08-22 DIAGNOSIS — M47.892 OTHER OSTEOARTHRITIS OF SPINE, CERVICAL REGION: ICD-10-CM

## 2018-08-22 PROCEDURE — 97110 THERAPEUTIC EXERCISES: CPT

## 2018-08-22 PROCEDURE — 97140 MANUAL THERAPY 1/> REGIONS: CPT

## 2018-08-22 NOTE — PROGRESS NOTES
CERVICAL PROGRESS NOTE:   Referring Physician: Dr. Doris Meier  Diagnosis: OA of cervical region     Date of Service: 8/22/2018     PATIENT Manuelito Melendez is a 59year old y/o female who presents to therapy today with complaints of decreased thoracic paraspinals. Bilateral UT tone right >left.      Strength:   UE   Mid trap strength: R4/5; L 4/5  Low trap strength: R3+/5; L4-/5             Date:8/9/18       TX#: 4/10 Date:8/13/18     TX#: 5/10 Date:8/15/18      TX#: 6/10 Date:8/20/18       TX#: hours without waking due to pain. IN PROGRESS. Pt will increase bilateral middle and low trap strength to 4/5 to lift 10lbs overhead while looking up for ADL completion. IN PROGRESS  Pt will be independent in [facility] for best functional outcome.  MET Juan Ramon Callahan

## 2018-08-27 ENCOUNTER — OFFICE VISIT (OUTPATIENT)
Dept: PHYSICAL THERAPY | Age: 63
End: 2018-08-27
Attending: INTERNAL MEDICINE
Payer: COMMERCIAL

## 2018-08-27 PROCEDURE — 97140 MANUAL THERAPY 1/> REGIONS: CPT

## 2018-08-27 PROCEDURE — 97110 THERAPEUTIC EXERCISES: CPT

## 2018-08-27 PROCEDURE — 97112 NEUROMUSCULAR REEDUCATION: CPT

## 2018-08-27 NOTE — PROGRESS NOTES
Dx: OA of cervical region           Authorized # of Visits:  12         Next MD visit: none scheduled  Fall Risk: standard         Precautions: Decreased right vertebral artery size. No cervical manipulation.          Subjective: Pt states her neck is feel 20x Neuro  TRX T's 20x Neuro  TRX T's 20x Neuro   Prone W's 2x10     TRX Y's 20x TRX Y's 20x Prone shoulder press 2x10 Prone 90/90 ER 5sx10 ea     Prone skydivers 2x10 Prone skydivers 2x10 Prone T's 2x10 Prone T's 2x10     Manual x10 min  UT STM  Cervical

## 2018-08-29 ENCOUNTER — OFFICE VISIT (OUTPATIENT)
Dept: PHYSICAL THERAPY | Age: 63
End: 2018-08-29
Attending: INTERNAL MEDICINE
Payer: COMMERCIAL

## 2018-08-29 PROCEDURE — 97112 NEUROMUSCULAR REEDUCATION: CPT

## 2018-08-29 PROCEDURE — 97110 THERAPEUTIC EXERCISES: CPT

## 2018-08-29 PROCEDURE — 97140 MANUAL THERAPY 1/> REGIONS: CPT

## 2018-08-29 NOTE — PROGRESS NOTES
Dx: OA of cervical region           Authorized # of Visits:  12         Next MD visit: none scheduled  Fall Risk: standard         Precautions: Decreased right vertebral artery size. No cervical manipulation.          Subjective: Pt states her neck is feel 3sx10 ea Open book str 3sx10 ea Open book str 3sx10 ea    High table pushup 20x Thoracic towel stretch at T12 3min reassessment Standing Y's 2x10 RTB -    Neuro  TRX T's 20x Neuro  TRX T's 20x Neuro  TRX T's 20x Neuro   Prone W's 2x10 Neuro   Prone W's 2x1

## 2018-09-05 ENCOUNTER — OFFICE VISIT (OUTPATIENT)
Dept: PHYSICAL THERAPY | Age: 63
End: 2018-09-05
Attending: INTERNAL MEDICINE
Payer: COMMERCIAL

## 2018-09-05 PROCEDURE — 97110 THERAPEUTIC EXERCISES: CPT

## 2018-09-05 NOTE — PROGRESS NOTES
CERVICAL DISCHARGE:   Referring Physician: Dr. Minor ref.  provider found  Diagnosis: OA of cervical region     Date of Service: 9/5/2018     PATIENT Handy Lover is a 59year old y/o female who presents to therapy today with complaints of neck increase bilateral cervical rotation to 70 deg to drive without limitation. MET  Pt will decrease highest reported pain to 3/10 to sleep 8 hours without waking due to pain.  MET   Pt will increase bilateral middle and low trap strength to 4/5 to lift 10lbs

## 2018-09-13 ENCOUNTER — OFFICE VISIT (OUTPATIENT)
Dept: INTERNAL MEDICINE CLINIC | Facility: CLINIC | Age: 63
End: 2018-09-13
Payer: COMMERCIAL

## 2018-09-13 VITALS
SYSTOLIC BLOOD PRESSURE: 132 MMHG | DIASTOLIC BLOOD PRESSURE: 86 MMHG | WEIGHT: 145.19 LBS | TEMPERATURE: 98 F | HEART RATE: 73 BPM | RESPIRATION RATE: 14 BRPM | BODY MASS INDEX: 27.41 KG/M2 | HEIGHT: 61 IN | OXYGEN SATURATION: 98 %

## 2018-09-13 DIAGNOSIS — I10 ESSENTIAL HYPERTENSION: ICD-10-CM

## 2018-09-13 DIAGNOSIS — M19.90 ARTHRITIS: Primary | ICD-10-CM

## 2018-09-13 DIAGNOSIS — M54.2 CERVICALGIA: ICD-10-CM

## 2018-09-13 PROCEDURE — 99213 OFFICE O/P EST LOW 20 MIN: CPT | Performed by: INTERNAL MEDICINE

## 2018-09-13 RX ORDER — LOSARTAN POTASSIUM 50 MG/1
50 TABLET ORAL DAILY
Qty: 90 TABLET | Refills: 1 | Status: SHIPPED | OUTPATIENT
Start: 2018-09-13 | End: 2018-11-20

## 2018-09-13 NOTE — PROGRESS NOTES
Sola Russo is a 59year old female.  To F/U from last visit regarding neck pain  HPI:    Interim history:she c/o ongoing finger arthritis, xrays confirmed OA  Her neck is better w/PT, she is satisfied at this point w/the progress  Right 2nd DIP is pa (hypertension)     Hyperlipidemia     YUSEF on CPAP     Low vitamin D level     Pre-diabetes     History of CVA with residual deficit     History of gestational diabetes mellitus (GDM)     Spondylarthrosis     Hand arthritis        REVIEW OF SYSTEMS:   GENER

## 2018-11-20 ENCOUNTER — OFFICE VISIT (OUTPATIENT)
Dept: INTERNAL MEDICINE CLINIC | Facility: CLINIC | Age: 63
End: 2018-11-20
Payer: COMMERCIAL

## 2018-11-20 VITALS
OXYGEN SATURATION: 98 % | RESPIRATION RATE: 16 BRPM | TEMPERATURE: 98 F | WEIGHT: 147.63 LBS | SYSTOLIC BLOOD PRESSURE: 128 MMHG | HEART RATE: 108 BPM | HEIGHT: 61 IN | BODY MASS INDEX: 27.87 KG/M2 | DIASTOLIC BLOOD PRESSURE: 82 MMHG

## 2018-11-20 DIAGNOSIS — I10 ESSENTIAL HYPERTENSION: Primary | ICD-10-CM

## 2018-11-20 PROCEDURE — 99213 OFFICE O/P EST LOW 20 MIN: CPT | Performed by: INTERNAL MEDICINE

## 2018-11-20 RX ORDER — VALSARTAN 80 MG/1
80 TABLET ORAL DAILY
Qty: 90 TABLET | Refills: 0 | Status: CANCELLED | OUTPATIENT
Start: 2018-11-20

## 2018-11-20 RX ORDER — VALSARTAN 80 MG/1
80 TABLET ORAL DAILY
Qty: 90 TABLET | Refills: 1 | Status: SHIPPED | OUTPATIENT
Start: 2018-11-20 | End: 2019-11-05

## 2018-11-20 RX ORDER — VALSARTAN 80 MG/1
80 TABLET ORAL DAILY
COMMUNITY
End: 2018-11-20

## 2018-11-20 RX ORDER — VALSARTAN 80 MG/1
80 TABLET ORAL DAILY
Qty: 90 TABLET | Refills: 1 | Status: SHIPPED | OUTPATIENT
Start: 2018-11-20 | End: 2018-11-20

## 2018-11-20 RX ORDER — ZOLPIDEM TARTRATE 5 MG/1
5 TABLET ORAL NIGHTLY PRN
Qty: 30 TABLET | Refills: 0 | Status: SHIPPED
Start: 2018-11-20 | End: 2019-11-05

## 2018-11-20 RX ORDER — DIAZEPAM 5 MG/1
5 TABLET ORAL EVERY 6 HOURS PRN
Qty: 30 TABLET | Refills: 0 | Status: SHIPPED
Start: 2018-11-20 | End: 2020-10-28

## 2018-11-20 NOTE — PROGRESS NOTES
Varun Mathis is a 59year old female.  To F/U from last visit regarding HTN   HPI:    Interim history:we increased her losartan to 50 mg and her bp skyrocketed so she resumed her valsartan she had at home   And was much better  Her pharmacist states th Spondylarthrosis     Hand arthritis        REVIEW OF SYSTEMS:   GENERAL HEALTH: feels well otherwise      EXAM:   /82 (BP Location: Left arm, Patient Position: Sitting, Cuff Size: adult)   Pulse 108   Temp 98 °F (36.7 °C) (Oral)   Resp 16   Ht 61\"

## 2019-11-04 ENCOUNTER — TELEPHONE (OUTPATIENT)
Dept: INTERNAL MEDICINE CLINIC | Facility: CLINIC | Age: 64
End: 2019-11-04

## 2019-11-04 DIAGNOSIS — K52.1 ANTIBIOTIC-ASSOCIATED DIARRHEA: ICD-10-CM

## 2019-11-04 DIAGNOSIS — R19.7 DIARRHEA, UNSPECIFIED TYPE: Primary | ICD-10-CM

## 2019-11-04 DIAGNOSIS — T36.95XA ANTIBIOTIC-ASSOCIATED DIARRHEA: ICD-10-CM

## 2019-11-04 NOTE — TELEPHONE ENCOUNTER
Stool for CDIF if she has been in any institutions or had Abx recently, o/w change tomorrow appt to acute

## 2019-11-04 NOTE — TELEPHONE ENCOUNTER
Patient called in patient has a physical scheduled for tomorrow at 2:00, patient is leaving for [facility] on Wednesday.   The patient has had consistent diarrhea since Saturday, wanted to get a stool card today for her visit tomorrow to see if she has [facility]

## 2019-11-04 NOTE — TELEPHONE ENCOUNTER
Pt called with complaint for diarrhea since Saturday. States was worse Saturday, took immodium Sunday so was better, but has gone 4x already today. Has some loose stool with every bathroom trip. Approx 7x in past 24 hrs.  Eating soups and liquids, feels get

## 2019-11-04 NOTE — TELEPHONE ENCOUNTER
Spoke with pt. Did advise earlier that appt may need to be changed to acute but called her back to confirm that we did change her PE tomorrow to a sick visit, PE will need to be addressed at later time. Pt stated understanding.

## 2019-11-04 NOTE — TELEPHONE ENCOUNTER
Spoke with pt. Advised c diff lab ordered. Instuctions given, advised must be loose stool or test will not run.   Since pt did have recent abx, tomorrow's appt kept as PE.

## 2019-11-05 ENCOUNTER — OFFICE VISIT (OUTPATIENT)
Dept: INTERNAL MEDICINE CLINIC | Facility: CLINIC | Age: 64
End: 2019-11-05
Payer: COMMERCIAL

## 2019-11-05 VITALS
SYSTOLIC BLOOD PRESSURE: 122 MMHG | OXYGEN SATURATION: 97 % | HEIGHT: 61 IN | WEIGHT: 149.19 LBS | DIASTOLIC BLOOD PRESSURE: 80 MMHG | HEART RATE: 99 BPM | RESPIRATION RATE: 14 BRPM | TEMPERATURE: 98 F | BODY MASS INDEX: 28.17 KG/M2

## 2019-11-05 DIAGNOSIS — Z00.00 ROUTINE GENERAL MEDICAL EXAMINATION AT A HEALTH CARE FACILITY: Primary | ICD-10-CM

## 2019-11-05 DIAGNOSIS — I69.30 HISTORY OF CVA WITH RESIDUAL DEFICIT: ICD-10-CM

## 2019-11-05 DIAGNOSIS — R79.89 LOW VITAMIN D LEVEL: ICD-10-CM

## 2019-11-05 DIAGNOSIS — G47.33 OSA (OBSTRUCTIVE SLEEP APNEA): ICD-10-CM

## 2019-11-05 DIAGNOSIS — R73.03 PRE-DIABETES: ICD-10-CM

## 2019-11-05 DIAGNOSIS — E78.2 MIXED HYPERLIPIDEMIA: ICD-10-CM

## 2019-11-05 DIAGNOSIS — Z12.31 BREAST CANCER SCREENING BY MAMMOGRAM: ICD-10-CM

## 2019-11-05 DIAGNOSIS — I10 ESSENTIAL HYPERTENSION: ICD-10-CM

## 2019-11-05 DIAGNOSIS — Z12.11 COLON CANCER SCREENING: ICD-10-CM

## 2019-11-05 PROBLEM — Z99.89 OSA ON CPAP: Status: RESOLVED | Noted: 2017-02-10 | Resolved: 2019-11-05

## 2019-11-05 PROCEDURE — 99214 OFFICE O/P EST MOD 30 MIN: CPT | Performed by: INTERNAL MEDICINE

## 2019-11-05 PROCEDURE — 99396 PREV VISIT EST AGE 40-64: CPT | Performed by: INTERNAL MEDICINE

## 2019-11-05 RX ORDER — LEVOCETIRIZINE DIHYDROCHLORIDE 5 MG/1
5 TABLET, FILM COATED ORAL EVERY EVENING
Qty: 90 TABLET | Refills: 3 | Status: SHIPPED | OUTPATIENT
Start: 2019-11-05 | End: 2020-10-28

## 2019-11-05 RX ORDER — ROSUVASTATIN CALCIUM 10 MG/1
10 TABLET, COATED ORAL DAILY
Qty: 90 TABLET | Refills: 1 | Status: SHIPPED | OUTPATIENT
Start: 2019-11-05 | End: 2020-04-21

## 2019-11-05 RX ORDER — VALSARTAN 80 MG/1
80 TABLET ORAL DAILY
Qty: 90 TABLET | Refills: 1 | Status: SHIPPED | OUTPATIENT
Start: 2019-11-05 | End: 2020-02-10

## 2019-11-05 RX ORDER — ZOLPIDEM TARTRATE 5 MG/1
5 TABLET ORAL NIGHTLY PRN
Qty: 30 TABLET | Refills: 0 | Status: SHIPPED | OUTPATIENT
Start: 2019-11-05 | End: 2020-10-28

## 2019-11-05 NOTE — PROGRESS NOTES
HPI:   Michelle Recinos is a 62year old female who presents for a complete physical exam. . HTN  Hypercholesterolemia  Pre-diabetes  YUSEF  Stroke  Colon polyps      Stroke history:  .  Dr Bola Quintana has a comprehensive review of events dictated in EPIC from 2 0   • diazepam 5 MG Oral Tab Take 1 tablet (5 mg total) by mouth every 6 (six) hours as needed for Anxiety. 30 tablet 0   • Esomeprazole Magnesium (NEXIUM) 20 MG Oral Capsule Delayed Release Take 20 mg by mouth every morning before breakfast. As needed. itching,  MUSCULOSKELETAL: she has new DIP finger arthritis and swelling and ongoing neck pain. Saws another physician who did cspine and she has spondylosis  NEURO: denies headaches, dizziness.  Old CVA and has left facial droop and some intermitent dysart general medical examination at a health care facility  (primary encounter diagnosis)  Encounter for screening mammogram for breast cancer  Hussain on cpap- study in [facility], care everywhere, she had moderate HUSSAIN and warrants CPAP  Low vitamin d level- check level Imaging & Consults:  GASTRO - INTERNAL  PULMONARY - INTERNAL  JUWAN TONY 2D+3D SCREENING BILAT (CPT=[id number])    No follow-ups on file.

## 2019-11-05 NOTE — TELEPHONE ENCOUNTER
Spoke with pt. Pt states is feeling better, asked that todays acute appt be changed back to a PE (was originally PE). Pt would prefer to just do PE now. Advised if Dr Phebe Brittle comfortable with changing appt back to PE, will do, otherwise will remain acute.

## 2019-11-05 NOTE — TELEPHONE ENCOUNTER
Pt calling regarding apt today said she is feeling better and wanted to get in for her Physical before end of the year. Pt wanted to know if apt today can be changed back to a Physical or if she will need to schedule another apt. Please advise. Thank you!

## 2019-11-21 ENCOUNTER — TELEPHONE (OUTPATIENT)
Dept: INTERNAL MEDICINE CLINIC | Facility: CLINIC | Age: 64
End: 2019-11-21

## 2019-11-21 NOTE — TELEPHONE ENCOUNTER
Per patient request, Nori Christensen from [facility] is asking for a 90 supply of the valsartan 80 MG Oral Tab to be resent to [facility].

## 2019-11-22 NOTE — TELEPHONE ENCOUNTER
Spoke to pharmacist.  Called in rx for valsartan 80 po daily #90/0 to local pharmacy, per pt's request.

## 2019-11-25 ENCOUNTER — HOSPITAL ENCOUNTER (OUTPATIENT)
Dept: MAMMOGRAPHY | Age: 64
Discharge: HOME | End: 2019-11-25
Attending: INTERNAL MEDICINE
Payer: COMMERCIAL

## 2019-11-25 ENCOUNTER — APPOINTMENT (OUTPATIENT)
Dept: LAB | Age: 64
End: 2019-11-25
Attending: INTERNAL MEDICINE
Payer: COMMERCIAL

## 2019-11-25 DIAGNOSIS — Z00.00 ROUTINE GENERAL MEDICAL EXAMINATION AT A HEALTH CARE FACILITY: ICD-10-CM

## 2019-11-25 DIAGNOSIS — Z12.31 BREAST CANCER SCREENING BY MAMMOGRAM: ICD-10-CM

## 2019-11-25 PROCEDURE — 82306 VITAMIN D 25 HYDROXY: CPT | Performed by: INTERNAL MEDICINE

## 2019-11-25 PROCEDURE — 80053 COMPREHEN METABOLIC PANEL: CPT | Performed by: INTERNAL MEDICINE

## 2019-11-25 PROCEDURE — 80061 LIPID PANEL: CPT | Performed by: INTERNAL MEDICINE

## 2019-11-25 PROCEDURE — 84443 ASSAY THYROID STIM HORMONE: CPT | Performed by: INTERNAL MEDICINE

## 2019-11-25 PROCEDURE — 77063 BREAST TOMOSYNTHESIS BI: CPT | Performed by: INTERNAL MEDICINE

## 2019-11-25 PROCEDURE — 77067 SCR MAMMO BI INCL CAD: CPT | Performed by: INTERNAL MEDICINE

## 2019-11-25 PROCEDURE — 83036 HEMOGLOBIN GLYCOSYLATED A1C: CPT | Performed by: INTERNAL MEDICINE

## 2019-11-25 PROCEDURE — 36415 COLL VENOUS BLD VENIPUNCTURE: CPT | Performed by: INTERNAL MEDICINE

## 2019-11-26 DIAGNOSIS — E55.9 VITAMIN D DEFICIENCY: ICD-10-CM

## 2019-11-26 DIAGNOSIS — Z13.820 SCREENING FOR OSTEOPOROSIS: ICD-10-CM

## 2019-11-26 DIAGNOSIS — R73.09 ELEVATED HEMOGLOBIN A1C: Primary | ICD-10-CM

## 2019-11-26 RX ORDER — GLUCOSAMINE HCL 500 MG
1 TABLET ORAL DAILY
Qty: 30 TABLET | Refills: 0 | COMMUNITY
Start: 2019-11-26 | End: 2021-10-15 | Stop reason: ALTCHOICE

## 2019-12-02 ENCOUNTER — HOSPITAL ENCOUNTER (OUTPATIENT)
Dept: BONE DENSITY | Age: 64
Discharge: HOME | End: 2019-12-02
Attending: INTERNAL MEDICINE
Payer: COMMERCIAL

## 2019-12-02 DIAGNOSIS — Z13.820 SCREENING FOR OSTEOPOROSIS: ICD-10-CM

## 2019-12-02 DIAGNOSIS — E55.9 VITAMIN D DEFICIENCY: ICD-10-CM

## 2019-12-02 PROCEDURE — 77080 DXA BONE DENSITY AXIAL: CPT | Performed by: INTERNAL MEDICINE

## 2019-12-04 NOTE — PROGRESS NOTES
Spoke to pt, aware of results and recommendations. Pt voiced that she isn't going to call the high risk clinic and that she is fine.

## 2020-02-04 RX ORDER — VALSARTAN 80 MG/1
TABLET ORAL
Qty: 90 TABLET | Refills: 1 | OUTPATIENT
Start: 2020-02-04

## 2020-02-08 DIAGNOSIS — I10 ESSENTIAL HYPERTENSION: Primary | ICD-10-CM

## 2020-02-10 ENCOUNTER — OFFICE VISIT (OUTPATIENT)
Dept: INTERNAL MEDICINE CLINIC | Facility: CLINIC | Age: 65
End: 2020-02-10
Payer: COMMERCIAL

## 2020-02-10 VITALS
WEIGHT: 149 LBS | TEMPERATURE: 98 F | DIASTOLIC BLOOD PRESSURE: 70 MMHG | BODY MASS INDEX: 28.13 KG/M2 | RESPIRATION RATE: 14 BRPM | SYSTOLIC BLOOD PRESSURE: 128 MMHG | OXYGEN SATURATION: 96 % | HEART RATE: 85 BPM | HEIGHT: 61 IN

## 2020-02-10 DIAGNOSIS — N30.00 ACUTE CYSTITIS WITHOUT HEMATURIA: Primary | ICD-10-CM

## 2020-02-10 LAB
APPEARANCE: CLEAR
BILIRUBIN: NEGATIVE
GLUCOSE (URINE DIPSTICK): NEGATIVE MG/DL
KETONES (URINE DIPSTICK): NEGATIVE MG/DL
MULTISTIX LOT#: NORMAL NUMERIC
NITRITE, URINE: NEGATIVE
OCCULT BLOOD: NEGATIVE
PH, URINE: 6.5 (ref 4.5–8)
PROTEIN (URINE DIPSTICK): NEGATIVE MG/DL
SPECIFIC GRAVITY: 1.02 (ref 1–1.03)
URINE-COLOR: YELLOW
UROBILINOGEN,SEMI-QN: 0.2 MG/DL (ref 0–1.9)

## 2020-02-10 PROCEDURE — 99213 OFFICE O/P EST LOW 20 MIN: CPT | Performed by: INTERNAL MEDICINE

## 2020-02-10 PROCEDURE — 87086 URINE CULTURE/COLONY COUNT: CPT | Performed by: INTERNAL MEDICINE

## 2020-02-10 PROCEDURE — 81003 URINALYSIS AUTO W/O SCOPE: CPT | Performed by: INTERNAL MEDICINE

## 2020-02-10 RX ORDER — SULFAMETHOXAZOLE AND TRIMETHOPRIM 800; 160 MG/1; MG/1
1 TABLET ORAL 2 TIMES DAILY
Qty: 14 TABLET | Refills: 0 | Status: SHIPPED | OUTPATIENT
Start: 2020-02-10 | End: 2020-02-17

## 2020-02-10 RX ORDER — VALSARTAN 80 MG/1
TABLET ORAL
Qty: 90 TABLET | Refills: 1 | Status: SHIPPED | OUTPATIENT
Start: 2020-02-10 | End: 2020-05-06

## 2020-02-10 NOTE — PROGRESS NOTES
Josue Manzano is a 62year old female  Patient presents with:  Bladder Problem: Pressure  Burning On Urination  Urinary Frequency      HPI:   When she returned from [place] yesterday after a 7hour flight   Had abrupt dysuria and urgency, no blood, fever or Spondylarthrosis     Hand arthritis     Social History:  Social History    Tobacco Use      Smoking status: Never Smoker      Smokeless tobacco: Never Used    Alcohol use:  Yes      Alcohol/week: 0.0 standard drinks      Comment: 3x per week    Drug use: No Sig: Take 1 tablet by mouth 2 (two) times daily for 7 days. Imaging & Consults:  None    No follow-ups on file. There are no Patient Instructions on file for this visit.        The patient indicates understanding of these issues and agrees to the p

## 2020-02-11 NOTE — PROGRESS NOTES
HISTORY OF PRESENT ILLNESS  Patient presents with:  Weight Problem: patient referred by Dr Perez Or, uses cpap machine       Violette is a 62year old female new to our office today for initiation of medical weight loss program.  Patient presen disease: negative  Constipation: negative  Other pertinent hx: none  Sleep Apnea hx: yes, using CPAP  Cancer hx: negative  Family or personal history of Pancreatic issues / Medullary Thyroid Cancer: negative  History of bariatric surgery: negative    [facility] 118 (H) 11/25/2019    BUN 16 11/25/2019    BUNCREA 17.0 11/25/2019    CREATSERUM 0.94 11/25/2019    ANIONGAP 4 11/25/2019    GFR 87 02/13/2017    GFRNAA 65 11/25/2019    GFRAA 75 11/25/2019    CA 9.4 11/25/2019    OSMOCALC 292 11/25/2019    ALKPHO 66 11/25 for Anxiety. , Disp: 30 tablet, Rfl: 0  multivitamin Oral Tab, Take 1 tablet by mouth daily. , Disp: , Rfl:   Calcium Carb-Cholecalciferol (CALCIUM + D3) 600-200 MG-UNIT Oral Tab, Take 1 tablet by mouth daily. , Disp: , Rfl:   loratadine (CLARITIN) 10 MG Oral ONLY)    Vitamin D deficiency  -     OP REFERRAL TO DIETITIAN [facility] (WLC USE ONLY)        PLAN  · Medication use and side effects reviewed with patient. Medication contraindications: avoid sympathomimetics with PMH.   · Follow up with dietitian and psych download elzbieta MyFitness Pal or Stephan Kumari! to monitor daily dietary intake and you will be able to see if you are eating the right amount of calories or too much or too little which would hinder weight loss.  Additionally this will help to see your daily carbohy

## 2020-02-12 ENCOUNTER — OFFICE VISIT (OUTPATIENT)
Dept: INTERNAL MEDICINE CLINIC | Facility: CLINIC | Age: 65
End: 2020-02-12
Payer: COMMERCIAL

## 2020-02-12 VITALS
RESPIRATION RATE: 14 BRPM | HEIGHT: 60.5 IN | DIASTOLIC BLOOD PRESSURE: 60 MMHG | WEIGHT: 150 LBS | SYSTOLIC BLOOD PRESSURE: 114 MMHG | BODY MASS INDEX: 28.69 KG/M2 | HEART RATE: 86 BPM

## 2020-02-12 DIAGNOSIS — Z51.81 ENCOUNTER FOR THERAPEUTIC DRUG MONITORING: Primary | ICD-10-CM

## 2020-02-12 DIAGNOSIS — I69.30 HISTORY OF CVA WITH RESIDUAL DEFICIT: ICD-10-CM

## 2020-02-12 DIAGNOSIS — E78.2 MIXED HYPERLIPIDEMIA: ICD-10-CM

## 2020-02-12 DIAGNOSIS — G47.33 OSA (OBSTRUCTIVE SLEEP APNEA): ICD-10-CM

## 2020-02-12 DIAGNOSIS — E55.9 VITAMIN D DEFICIENCY: ICD-10-CM

## 2020-02-12 DIAGNOSIS — E66.3 PATIENT OVERWEIGHT: ICD-10-CM

## 2020-02-12 DIAGNOSIS — R73.03 PRE-DIABETES: ICD-10-CM

## 2020-02-12 PROCEDURE — 99204 OFFICE O/P NEW MOD 45 MIN: CPT | Performed by: NURSE PRACTITIONER

## 2020-02-12 NOTE — PATIENT INSTRUCTIONS
Welcome to the [facility]...your Lifestyle Renovation begins now! Thank you for placing your trust in our health care team, I look forward to working with you along this journey to better health!     Next steps:     1.  Sched week at a minimum. Meditation daily can help manage and control stress. Chronic stress can make weight loss difficult.   Exercising is one way to help with stress, but meditation using the CALM Sofiya or another comparable alternative can be done in your ho

## 2020-03-10 ENCOUNTER — TELEPHONE (OUTPATIENT)
Dept: INTERNAL MEDICINE CLINIC | Facility: CLINIC | Age: 65
End: 2020-03-10

## 2020-04-10 ENCOUNTER — TELEPHONE (OUTPATIENT)
Dept: INTERNAL MEDICINE CLINIC | Facility: CLINIC | Age: 65
End: 2020-04-10

## 2020-04-13 NOTE — TELEPHONE ENCOUNTER
4/10/20  @ 9:15am Spoke to Fredi Shabazz at [facility], #[phone] , MIKEL#[phone]. They verified that patient has following benefits for below services:   Calendar year plan effective 1-1-20 through 4-30-20. Patient has a 31 day kylah period under plan.    DX

## 2020-04-14 ENCOUNTER — TELEPHONE (OUTPATIENT)
Dept: INTERNAL MEDICINE CLINIC | Facility: CLINIC | Age: 65
End: 2020-04-14

## 2020-04-14 ENCOUNTER — OFFICE VISIT (OUTPATIENT)
Dept: INTERNAL MEDICINE CLINIC | Facility: CLINIC | Age: 65
End: 2020-04-14
Payer: COMMERCIAL

## 2020-04-14 VITALS
HEART RATE: 84 BPM | DIASTOLIC BLOOD PRESSURE: 80 MMHG | BODY MASS INDEX: 28.09 KG/M2 | HEIGHT: 60.5 IN | SYSTOLIC BLOOD PRESSURE: 140 MMHG | TEMPERATURE: 98 F | RESPIRATION RATE: 12 BRPM | WEIGHT: 146.88 LBS

## 2020-04-14 DIAGNOSIS — I80.8 SUPERFICIAL THROMBOPHLEBITIS OF RIGHT UPPER EXTREMITY: Primary | ICD-10-CM

## 2020-04-14 PROCEDURE — 99214 OFFICE O/P EST MOD 30 MIN: CPT | Performed by: INTERNAL MEDICINE

## 2020-04-14 NOTE — PROGRESS NOTES
Sola Russo is a 62year old female  Patient presents with:  Arm Pain      HPI:   Pain flexor surface right upper arm for 2 days  Slight swelling in the antecubital yesterday  Today red, warm, swollen and tender streak distal flexor upper arm w/tende History of gestational diabetes mellitus (GDM)     Spondylarthrosis     Hand arthritis     Encounter for therapeutic drug monitoring     BMI 28.0-28.9,adult     Social History:  Social History    Tobacco Use      Smoking status: Never Smoker      Smokel Reassured pt this is usually self-resolving and there is no clinical evidence of underlying infection  She will use hot packs, elevation, Ibuprofen w/food as tolerated, and res  Advised timeline for healing may be a few weeks, call if worse symptoms, diffu ? Heat: Use a warm compress, such as a heating pad. ? Cold: Use a cold compress, such as a cold pack or bag of ice wrapped in a thin towel. · Take nonsteroidal anti-inflammatory drugs (NSAIDS), such as ibuprofen.  In some cases, other pain medicines may b

## 2020-04-14 NOTE — PATIENT INSTRUCTIONS
Superficial Thrombophlebitis   The superficial veins are the veins near the surface of the skin. Superficial thrombophlebitis is a problem that occurs when one or more of these veins become red, irritated, and swollen.  This is most often because of a blo Follow-up care  Follow up with your healthcare provider as advised. If imaging tests are done, they will be reviewed by a doctor. You’ll be told the results and any new findings that may affect your treatment.   When to seek medical advice  Call your health

## 2020-04-14 NOTE — TELEPHONE ENCOUNTER
Spoke with pt. States has had right arm pain from \"armpit to elbow\" since Saturday 4/11/20. Yesterday noticed \"bruie streak along arm where pain is - reddish purplish\". Whole arm tender to touch, cannot raise arm without pain. Able to bend elbow.  Katja Amel

## 2020-04-14 NOTE — TELEPHONE ENCOUNTER
Patient has had pain under her right armpit down to her elbow for the last 4-5 days. She does not have a fever or a cough and did not mention other symptoms. Does she need an appt? Please advise. Thank you!

## 2020-04-20 DIAGNOSIS — E78.2 MIXED HYPERLIPIDEMIA: Primary | ICD-10-CM

## 2020-04-21 RX ORDER — ROSUVASTATIN CALCIUM 10 MG/1
TABLET, COATED ORAL
Qty: 90 TABLET | Refills: 1 | Status: SHIPPED | OUTPATIENT
Start: 2020-04-21 | End: 2020-10-28

## 2020-05-06 DIAGNOSIS — I10 ESSENTIAL HYPERTENSION: ICD-10-CM

## 2020-05-06 RX ORDER — VALSARTAN 80 MG/1
80 TABLET ORAL DAILY
Qty: 90 TABLET | Refills: 0 | OUTPATIENT
Start: 2020-05-06

## 2020-05-06 RX ORDER — OLMESARTAN MEDOXOMIL 20 MG/1
20 TABLET ORAL DAILY
Qty: 30 TABLET | Refills: 0 | Status: SHIPPED | OUTPATIENT
Start: 2020-05-06 | End: 2020-05-06

## 2020-05-06 RX ORDER — VALSARTAN 80 MG/1
80 TABLET ORAL DAILY
Qty: 90 TABLET | Refills: 1 | Status: SHIPPED | OUTPATIENT
Start: 2020-05-06 | End: 2020-10-28

## 2020-05-06 NOTE — TELEPHONE ENCOUNTER
Spoke to patient and she had just gotten off the phone with Liyah in [facility] and they have the Valsartan 80 mg tabs In Stock there.   Patient requesting Refill be sent there and is Refusing Olmesartan - says she didn't like it in the past.  New Refill

## 2020-05-06 NOTE — TELEPHONE ENCOUNTER
Olmesartan 20 mg daily is the equivalent of valsartan 80mg daily. 30 day supply of olmesartan 20mg daily sent to pharmacy. Let the patient know to monitor her BP at home if possible once a day to monitor. Thanks.

## 2020-05-06 NOTE — TELEPHONE ENCOUNTER
Pharmacy sent in a Request to Replace patient's Valsartan Rx - on Backorder with No Release Date    Pharmacy sent Suggestions of:  Losartan or Olmesartan    FYI - was informed that All Losartan Scripts are on Backorder as well    Please Advise which Rx is

## 2020-05-14 ENCOUNTER — TELEPHONE (OUTPATIENT)
Dept: INTERNAL MEDICINE CLINIC | Facility: CLINIC | Age: 65
End: 2020-05-14

## 2020-05-14 NOTE — TELEPHONE ENCOUNTER
Left detailed message per HIPAA. Advised to complete labs. Provided [employer] locations. Also sent message via [employer].

## 2020-05-14 NOTE — PROGRESS NOTES
Please call pt  I was reviewing chart and noteiced she never f/u for abnormal A1C in [facility]  She needs to do the f/u labwork I ordered in november

## 2020-05-14 NOTE — TELEPHONE ENCOUNTER
Author: Aristides Wilkerson MD Service: Kiko Crane Type: Physician   Filed: 5/14/2020  2:58 PM Encounter Date: 4/14/2020 Status: Signed   : Aristides Wilkerson MD (Physician)        Show:Clear all  [x]Manual[]Template[]Copied    Added by:  [x]E

## 2020-05-28 ENCOUNTER — LAB ENCOUNTER (OUTPATIENT)
Dept: LAB | Age: 65
End: 2020-05-28
Attending: INTERNAL MEDICINE
Payer: COMMERCIAL

## 2020-05-28 ENCOUNTER — TELEPHONE (OUTPATIENT)
Dept: INTERNAL MEDICINE CLINIC | Facility: CLINIC | Age: 65
End: 2020-05-28

## 2020-05-28 DIAGNOSIS — E78.2 MIXED HYPERLIPIDEMIA: ICD-10-CM

## 2020-05-28 DIAGNOSIS — E66.3 PATIENT OVERWEIGHT: ICD-10-CM

## 2020-05-28 DIAGNOSIS — E55.9 VITAMIN D DEFICIENCY: ICD-10-CM

## 2020-05-28 DIAGNOSIS — R73.09 ELEVATED HEMOGLOBIN A1C: ICD-10-CM

## 2020-05-28 DIAGNOSIS — Z51.81 ENCOUNTER FOR THERAPEUTIC DRUG MONITORING: ICD-10-CM

## 2020-05-28 DIAGNOSIS — G47.33 OSA (OBSTRUCTIVE SLEEP APNEA): ICD-10-CM

## 2020-05-28 DIAGNOSIS — R73.03 PRE-DIABETES: ICD-10-CM

## 2020-05-28 PROCEDURE — 82306 VITAMIN D 25 HYDROXY: CPT | Performed by: INTERNAL MEDICINE

## 2020-05-28 PROCEDURE — 82607 VITAMIN B-12: CPT | Performed by: NURSE PRACTITIONER

## 2020-05-28 PROCEDURE — 82947 ASSAY GLUCOSE BLOOD QUANT: CPT | Performed by: INTERNAL MEDICINE

## 2020-05-28 PROCEDURE — 85025 COMPLETE CBC W/AUTO DIFF WBC: CPT | Performed by: NURSE PRACTITIONER

## 2020-05-28 PROCEDURE — 83036 HEMOGLOBIN GLYCOSYLATED A1C: CPT | Performed by: INTERNAL MEDICINE

## 2020-05-28 PROCEDURE — 36415 COLL VENOUS BLD VENIPUNCTURE: CPT | Performed by: NURSE PRACTITIONER

## 2020-05-28 NOTE — TELEPHONE ENCOUNTER
Patient had blood drawn at the [facility] lab in [facility] today. She was told the lab can do antibody testing now and would like an order added. She was told it can be added to her current orders until 2:00 today. Please advise. Thank you!

## 2020-05-29 NOTE — PROGRESS NOTES
Left detailed message per HIPAA. Advised to call back to schedule TV to discuss labs & med f/u. PSR - Please schedule pt when calls back, thanks!

## 2020-06-02 ENCOUNTER — TELEPHONE (OUTPATIENT)
Dept: INTERNAL MEDICINE CLINIC | Facility: CLINIC | Age: 65
End: 2020-06-02

## 2020-06-02 NOTE — TELEPHONE ENCOUNTER
Patient was referred to [facility] M.D. [facility] for an appointment and got cancelled d/t covid and never got rescheduled. Would you be able to contact her now?

## 2020-06-30 ENCOUNTER — OFFICE VISIT (OUTPATIENT)
Dept: INTERNAL MEDICINE CLINIC | Facility: CLINIC | Age: 65
End: 2020-06-30
Payer: COMMERCIAL

## 2020-06-30 DIAGNOSIS — Z86.32 HISTORY OF GESTATIONAL DIABETES MELLITUS (GDM): ICD-10-CM

## 2020-06-30 DIAGNOSIS — I10 ESSENTIAL HYPERTENSION: ICD-10-CM

## 2020-06-30 DIAGNOSIS — R73.03 PRE-DIABETES: ICD-10-CM

## 2020-06-30 DIAGNOSIS — E78.1 PURE HYPERTRIGLYCERIDEMIA: ICD-10-CM

## 2020-06-30 PROCEDURE — 97802 MEDICAL NUTRITION INDIV IN: CPT | Performed by: DIETITIAN, REGISTERED

## 2020-07-02 VITALS — BODY MASS INDEX: 28 KG/M2 | WEIGHT: 147.38 LBS

## 2020-07-02 NOTE — PROGRESS NOTES
INITIAL OUTPATIENT NUTRITION CONSULTATION    Pt verbally consents to a virtual audio-video consultation on 6/30/20. Pt understands and accepts financial responsibility for any deductible, co-insurance and/or co-pays associated with this service. (H) 30 - 149 mg/dL Final     Comment:     Reference interval for fasting triglycerides  Desirable: <150 mg/dL  Borderline: 150-199 mg/dL  High: 200-499 mg/dL  Very High: >=500 mg/dL           LDL Cholesterol   Date Value Ref Range Status   11/25/2019 83 <1 gms carb/d (35% of calories)    Food Journal: Tracking on M[employer] previously. Spent 45 minutes in consultation with the patient. Nutrition Intervention/Education:  Comprehensive nutrition education and evaluation provided for weight loss.  Pt frustrated

## 2020-10-28 ENCOUNTER — OFFICE VISIT (OUTPATIENT)
Dept: INTERNAL MEDICINE CLINIC | Facility: CLINIC | Age: 65
End: 2020-10-28
Payer: COMMERCIAL

## 2020-10-28 VITALS
RESPIRATION RATE: 14 BRPM | HEIGHT: 61 IN | DIASTOLIC BLOOD PRESSURE: 80 MMHG | SYSTOLIC BLOOD PRESSURE: 120 MMHG | TEMPERATURE: 97 F | HEART RATE: 92 BPM | WEIGHT: 147.38 LBS | BODY MASS INDEX: 27.83 KG/M2 | OXYGEN SATURATION: 99 %

## 2020-10-28 DIAGNOSIS — M54.50 ACUTE BILATERAL LOW BACK PAIN WITHOUT SCIATICA: ICD-10-CM

## 2020-10-28 DIAGNOSIS — E78.2 MIXED HYPERLIPIDEMIA: ICD-10-CM

## 2020-10-28 DIAGNOSIS — H61.22 HEARING LOSS OF LEFT EAR DUE TO CERUMEN IMPACTION: ICD-10-CM

## 2020-10-28 DIAGNOSIS — R39.9 UTI SYMPTOMS: Primary | ICD-10-CM

## 2020-10-28 DIAGNOSIS — I10 ESSENTIAL HYPERTENSION: ICD-10-CM

## 2020-10-28 DIAGNOSIS — Z12.31 ENCOUNTER FOR SCREENING MAMMOGRAM FOR BREAST CANCER: ICD-10-CM

## 2020-10-28 DIAGNOSIS — Z23 NEED FOR VACCINATION: ICD-10-CM

## 2020-10-28 PROCEDURE — 90686 IIV4 VACC NO PRSV 0.5 ML IM: CPT | Performed by: NURSE PRACTITIONER

## 2020-10-28 PROCEDURE — 99214 OFFICE O/P EST MOD 30 MIN: CPT | Performed by: NURSE PRACTITIONER

## 2020-10-28 PROCEDURE — 90471 IMMUNIZATION ADMIN: CPT | Performed by: NURSE PRACTITIONER

## 2020-10-28 PROCEDURE — 3008F BODY MASS INDEX DOCD: CPT | Performed by: NURSE PRACTITIONER

## 2020-10-28 PROCEDURE — 87086 URINE CULTURE/COLONY COUNT: CPT | Performed by: NURSE PRACTITIONER

## 2020-10-28 PROCEDURE — 3074F SYST BP LT 130 MM HG: CPT | Performed by: NURSE PRACTITIONER

## 2020-10-28 PROCEDURE — 3079F DIAST BP 80-89 MM HG: CPT | Performed by: NURSE PRACTITIONER

## 2020-10-28 PROCEDURE — 81003 URINALYSIS AUTO W/O SCOPE: CPT | Performed by: NURSE PRACTITIONER

## 2020-10-28 RX ORDER — LEVOCETIRIZINE DIHYDROCHLORIDE 5 MG/1
5 TABLET, FILM COATED ORAL EVERY EVENING
Qty: 90 TABLET | Refills: 0 | Status: SHIPPED | OUTPATIENT
Start: 2020-10-28 | End: 2021-06-16

## 2020-10-28 RX ORDER — CEPHALEXIN 500 MG/1
500 CAPSULE ORAL 2 TIMES DAILY
Qty: 14 CAPSULE | Refills: 0 | Status: SHIPPED | OUTPATIENT
Start: 2020-10-28 | End: 2020-11-04

## 2020-10-28 RX ORDER — VALSARTAN 80 MG/1
80 TABLET ORAL DAILY
Qty: 90 TABLET | Refills: 0 | Status: SHIPPED | OUTPATIENT
Start: 2020-10-28 | End: 2020-12-04

## 2020-10-28 RX ORDER — ZOLPIDEM TARTRATE 5 MG/1
5 TABLET ORAL NIGHTLY PRN
Qty: 30 TABLET | Refills: 0 | Status: SHIPPED | OUTPATIENT
Start: 2020-10-28 | End: 2021-06-16

## 2020-10-28 RX ORDER — ROSUVASTATIN CALCIUM 10 MG/1
10 TABLET, COATED ORAL DAILY
Qty: 90 TABLET | Refills: 0 | Status: SHIPPED | OUTPATIENT
Start: 2020-10-28 | End: 2020-12-04

## 2020-10-28 NOTE — PROGRESS NOTES
Chaya Calhoun is a 60year old female. CHIEF COMPLAINT   UTI symptoms, med refill    HPI:   The patient symptoms started 2 days ago with bladder pressure and urgency as well as frequency. She denies blood to the urine, fever, chills, flank pain.  No ri Tobacco Use      Smoking status: Never Smoker      Smokeless tobacco: Never Used    Alcohol use:  Yes      Alcohol/week: 0.0 standard drinks      Comment: 3x per week    Drug use: No       REVIEW OF SYSTEMS:   See HPI    EXAM:     /80 (BP Location: Ri LDL 83 11/25/2019    VLDL 52 (H) 11/25/2019    TCHDLRATIO 3.61 06/22/2018    [place] 135 (H) 11/25/2019      Lab Results   Component Value Date    T4F 0.8 (L) 07/17/2018    TSH 1.420 11/25/2019      Lab Results   Component Value Date     (H) 05/28/2 needed or when annual visit is due

## 2020-10-28 NOTE — PATIENT INSTRUCTIONS
Take antibiotic completely as ordered     Take antibiotic with food    Eat yogurt twice a day while on antibiotic or take an oral probiotic     Monitor for diarrhea and side effects     Use debrox drops to the left ear for one week.  Follow up as needed for

## 2020-11-09 ENCOUNTER — TELEPHONE (OUTPATIENT)
Dept: INTERNAL MEDICINE CLINIC | Facility: CLINIC | Age: 65
End: 2020-11-09

## 2020-11-09 NOTE — TELEPHONE ENCOUNTER
Fax referral sent from [place] [address]?:  No                           Provider's Name?:  [address]?:  Dental General practice  Reason for Visit?:  Bilateral Tomography Evaluation  Diagnosis?:

## 2020-11-10 NOTE — TELEPHONE ENCOUNTER
Reviewed faxed paperwork received. Not clear on what their request is. Requested they clarify. Faxed back. Pt with PPO insurance as well.

## 2020-11-11 NOTE — TELEPHONE ENCOUNTER
Received 2nd fax with exact same computer generated papers including my written request for clarification with note stating to address a finding on report.     Left detailed msg at [phone] requesting them to call back to talk with nurse to

## 2020-11-12 NOTE — TELEPHONE ENCOUNTER
Jennifer Pillai from [facility] calling back and states that the referral is for the pt that contains a CBCT report that is an X-ray and they send it off to a radiologist to be read and that report stated that the pt has calcifications of the carotid artery, so

## 2020-11-17 NOTE — TELEPHONE ENCOUNTER
The results of the referral for the dental sleep center were noted. #7 was noted as far as possible carotid disease.   The patient is already on a statin and aspirin which is medical management but an appointment can be made to evaluate the patient and see

## 2020-11-17 NOTE — TELEPHONE ENCOUNTER
Received radiology report from Dr So Jain office ([address] [facility]). See pg 2, #7 under impressions. Routed to [facility] covering for Dr Tanvi Charles today.

## 2020-11-18 NOTE — TELEPHONE ENCOUNTER
Spoke to pt. Advised of Renetta's note below, recommending OV to evaluate & discuss testing for carotid disease.   Pt agreeable & scheduled OV with Dr. Elton White on 12/4/2020 at 1:40PM.    BARI Yuen, please forward that radiology report from [facility]

## 2020-11-19 ENCOUNTER — MED REC SCAN ONLY (OUTPATIENT)
Dept: INTERNAL MEDICINE CLINIC | Facility: CLINIC | Age: 65
End: 2020-11-19

## 2020-11-30 ENCOUNTER — HOSPITAL ENCOUNTER (OUTPATIENT)
Dept: MAMMOGRAPHY | Age: 65
Discharge: HOME | End: 2020-11-30
Attending: NURSE PRACTITIONER
Payer: COMMERCIAL

## 2020-11-30 DIAGNOSIS — Z12.31 ENCOUNTER FOR SCREENING MAMMOGRAM FOR BREAST CANCER: ICD-10-CM

## 2020-11-30 PROCEDURE — 77067 SCR MAMMO BI INCL CAD: CPT | Performed by: NURSE PRACTITIONER

## 2020-11-30 PROCEDURE — 77063 BREAST TOMOSYNTHESIS BI: CPT | Performed by: NURSE PRACTITIONER

## 2020-12-04 ENCOUNTER — OFFICE VISIT (OUTPATIENT)
Dept: INTERNAL MEDICINE CLINIC | Facility: CLINIC | Age: 65
End: 2020-12-04
Payer: COMMERCIAL

## 2020-12-04 VITALS
HEART RATE: 103 BPM | SYSTOLIC BLOOD PRESSURE: 124 MMHG | DIASTOLIC BLOOD PRESSURE: 78 MMHG | BODY MASS INDEX: 27.45 KG/M2 | RESPIRATION RATE: 14 BRPM | WEIGHT: 145.38 LBS | HEIGHT: 61 IN | TEMPERATURE: 99 F | OXYGEN SATURATION: 97 %

## 2020-12-04 DIAGNOSIS — R93.0 ABNORMAL CT SCAN OF HEAD: ICD-10-CM

## 2020-12-04 DIAGNOSIS — Z00.00 ROUTINE GENERAL MEDICAL EXAMINATION AT A HEALTH CARE FACILITY: Primary | ICD-10-CM

## 2020-12-04 DIAGNOSIS — I10 ESSENTIAL HYPERTENSION: ICD-10-CM

## 2020-12-04 DIAGNOSIS — E78.2 MIXED HYPERLIPIDEMIA: ICD-10-CM

## 2020-12-04 DIAGNOSIS — I77.810 AORTIC ECTASIA, THORACIC (HCC): ICD-10-CM

## 2020-12-04 DIAGNOSIS — Z86.73 HISTORY OF CVA (CEREBROVASCULAR ACCIDENT): ICD-10-CM

## 2020-12-04 DIAGNOSIS — Z20.822 SUSPECTED COVID-19 VIRUS INFECTION: ICD-10-CM

## 2020-12-04 PROCEDURE — 99214 OFFICE O/P EST MOD 30 MIN: CPT | Performed by: INTERNAL MEDICINE

## 2020-12-04 PROCEDURE — 3078F DIAST BP <80 MM HG: CPT | Performed by: INTERNAL MEDICINE

## 2020-12-04 PROCEDURE — 3074F SYST BP LT 130 MM HG: CPT | Performed by: INTERNAL MEDICINE

## 2020-12-04 PROCEDURE — 3008F BODY MASS INDEX DOCD: CPT | Performed by: INTERNAL MEDICINE

## 2020-12-04 RX ORDER — VALSARTAN 80 MG/1
80 TABLET ORAL DAILY
Qty: 90 TABLET | Refills: 0 | Status: SHIPPED | OUTPATIENT
Start: 2020-12-04 | End: 2021-04-09

## 2020-12-04 RX ORDER — ROSUVASTATIN CALCIUM 10 MG/1
10 TABLET, COATED ORAL DAILY
Qty: 90 TABLET | Refills: 0 | Status: SHIPPED | OUTPATIENT
Start: 2020-12-04 | End: 2021-04-09

## 2020-12-04 NOTE — PROGRESS NOTES
Sola Russo is a 60year old female  Patient presents with:  Consult: Discuss Carotid disease      HPI:   She had some sort of hany with dental oral appliance evaluation and showed \"some carotid disease\"  Pt has hx of right CVA 2007, carotids natalee Patient Active Problem List:     Stroke [facility])     HTN (hypertension)     Hyperlipidemia     YUSEF (obstructive sleep apnea)     Low vitamin D level     Pre-diabetes     History of CVA with residual deficit     History of gestational diabetes mellitus (GDM) NECK GLANDS:  The parotid, submandibular, and thyroid glands are unremarkable. LYMPH NODES:  No pathological-appearing or enlarged lymph nodes. SKULL BASE:  Foramina are symmetric without bony erosion.     VASCULATURE:  Small right vertebral artery, Signed Prescriptions Disp Refills   • valsartan 80 MG Oral Tab 90 tablet 0     Sig: Take 1 tablet (80 mg total) by mouth daily. • Rosuvastatin Calcium 10 MG Oral Tab 90 tablet 0     Sig: Take 1 tablet (10 mg total) by mouth daily.        Imaging & Con

## 2020-12-08 DIAGNOSIS — E78.2 MIXED HYPERLIPIDEMIA: ICD-10-CM

## 2020-12-08 RX ORDER — ROSUVASTATIN CALCIUM 10 MG/1
10 TABLET, COATED ORAL DAILY
Qty: 90 TABLET | Refills: 0 | OUTPATIENT
Start: 2020-12-08

## 2020-12-15 ENCOUNTER — HOSPITAL ENCOUNTER (OUTPATIENT)
Dept: CARDIOLOGY CLINIC | Facility: HOSPITAL | Age: 65
Discharge: HOME | End: 2020-12-15
Attending: INTERNAL MEDICINE
Payer: COMMERCIAL

## 2020-12-15 ENCOUNTER — HOSPITAL ENCOUNTER (OUTPATIENT)
Dept: CV DIAGNOSTICS | Facility: HOSPITAL | Age: 65
Discharge: HOME | End: 2020-12-15
Attending: INTERNAL MEDICINE
Payer: COMMERCIAL

## 2020-12-15 DIAGNOSIS — R93.0 ABNORMAL CT SCAN OF HEAD: ICD-10-CM

## 2020-12-15 DIAGNOSIS — I77.810 AORTIC ECTASIA, THORACIC (HCC): ICD-10-CM

## 2020-12-15 DIAGNOSIS — Z86.73 HISTORY OF CVA (CEREBROVASCULAR ACCIDENT): ICD-10-CM

## 2020-12-15 PROBLEM — M19.049 HAND ARTHRITIS: Status: RESOLVED | Noted: 2018-07-17 | Resolved: 2020-12-15

## 2020-12-15 PROBLEM — I65.23 CAROTID ARTERY PLAQUE, BILATERAL: Status: ACTIVE | Noted: 2020-12-15

## 2020-12-15 PROCEDURE — 93306 TTE W/DOPPLER COMPLETE: CPT | Performed by: INTERNAL MEDICINE

## 2020-12-15 PROCEDURE — 93880 EXTRACRANIAL BILAT STUDY: CPT | Performed by: INTERNAL MEDICINE

## 2020-12-16 ENCOUNTER — LAB ENCOUNTER (OUTPATIENT)
Dept: LAB | Age: 65
End: 2020-12-16
Attending: INTERNAL MEDICINE
Payer: COMMERCIAL

## 2020-12-16 DIAGNOSIS — Z00.00 ROUTINE GENERAL MEDICAL EXAMINATION AT A HEALTH CARE FACILITY: ICD-10-CM

## 2020-12-16 DIAGNOSIS — Z20.822 SUSPECTED COVID-19 VIRUS INFECTION: ICD-10-CM

## 2020-12-16 PROCEDURE — 86769 SARS-COV-2 COVID-19 ANTIBODY: CPT

## 2020-12-16 PROCEDURE — 84443 ASSAY THYROID STIM HORMONE: CPT

## 2020-12-16 PROCEDURE — 85025 COMPLETE CBC W/AUTO DIFF WBC: CPT

## 2020-12-16 PROCEDURE — 80053 COMPREHEN METABOLIC PANEL: CPT

## 2020-12-16 PROCEDURE — 36415 COLL VENOUS BLD VENIPUNCTURE: CPT

## 2020-12-16 PROCEDURE — 83036 HEMOGLOBIN GLYCOSYLATED A1C: CPT

## 2020-12-16 PROCEDURE — 80061 LIPID PANEL: CPT

## 2020-12-28 ENCOUNTER — PATIENT MESSAGE (OUTPATIENT)
Dept: INTERNAL MEDICINE CLINIC | Facility: CLINIC | Age: 65
End: 2020-12-28

## 2020-12-29 NOTE — TELEPHONE ENCOUNTER
From: Deonte Thapa  To: Ayesha Pool MD  Sent: 12/28/2020 4:28 PM CST  Subject: Non-Urgent Medical Question    Just wondering if you would recommend compression socks for the 10 hr flight to [facility]? If so, do I need a prescription?  If not, wh

## 2020-12-29 NOTE — TELEPHONE ENCOUNTER
Please see drop.io message. Patient is taking a 10 hour flight to [facility]. Would you recommend compression stockings?

## 2021-02-15 ENCOUNTER — OFFICE VISIT (OUTPATIENT)
Dept: INTERNAL MEDICINE CLINIC | Facility: CLINIC | Age: 66
End: 2021-02-15
Payer: COMMERCIAL

## 2021-02-15 VITALS
OXYGEN SATURATION: 98 % | BODY MASS INDEX: 28.65 KG/M2 | WEIGHT: 149.81 LBS | HEIGHT: 60.63 IN | HEART RATE: 102 BPM | TEMPERATURE: 99 F | DIASTOLIC BLOOD PRESSURE: 80 MMHG | SYSTOLIC BLOOD PRESSURE: 130 MMHG | RESPIRATION RATE: 16 BRPM

## 2021-02-15 DIAGNOSIS — E78.1 PURE HYPERTRIGLYCERIDEMIA: ICD-10-CM

## 2021-02-15 DIAGNOSIS — I77.810 ASCENDING AORTA DILATION (HCC): ICD-10-CM

## 2021-02-15 DIAGNOSIS — Z12.4 CERVICAL CANCER SCREENING: ICD-10-CM

## 2021-02-15 DIAGNOSIS — I65.23 CAROTID ARTERY PLAQUE, BILATERAL: ICD-10-CM

## 2021-02-15 DIAGNOSIS — Z12.31 BREAST CANCER SCREENING BY MAMMOGRAM: ICD-10-CM

## 2021-02-15 DIAGNOSIS — R73.03 PRE-DIABETES: ICD-10-CM

## 2021-02-15 DIAGNOSIS — G47.33 OSA (OBSTRUCTIVE SLEEP APNEA): ICD-10-CM

## 2021-02-15 DIAGNOSIS — I10 ESSENTIAL HYPERTENSION: ICD-10-CM

## 2021-02-15 DIAGNOSIS — I69.30 HISTORY OF CVA WITH RESIDUAL DEFICIT: ICD-10-CM

## 2021-02-15 DIAGNOSIS — Z00.00 ROUTINE GENERAL MEDICAL EXAMINATION AT A HEALTH CARE FACILITY: Primary | ICD-10-CM

## 2021-02-15 DIAGNOSIS — K21.9 GASTROESOPHAGEAL REFLUX DISEASE WITHOUT ESOPHAGITIS: ICD-10-CM

## 2021-02-15 PROBLEM — Z51.81 ENCOUNTER FOR THERAPEUTIC DRUG MONITORING: Status: RESOLVED | Noted: 2020-02-12 | Resolved: 2021-02-15

## 2021-02-15 PROCEDURE — 3008F BODY MASS INDEX DOCD: CPT | Performed by: INTERNAL MEDICINE

## 2021-02-15 PROCEDURE — 3075F SYST BP GE 130 - 139MM HG: CPT | Performed by: INTERNAL MEDICINE

## 2021-02-15 PROCEDURE — 99000 SPECIMEN HANDLING OFFICE-LAB: CPT | Performed by: INTERNAL MEDICINE

## 2021-02-15 PROCEDURE — 99396 PREV VISIT EST AGE 40-64: CPT | Performed by: INTERNAL MEDICINE

## 2021-02-15 PROCEDURE — 87624 HPV HI-RISK TYP POOLED RSLT: CPT | Performed by: INTERNAL MEDICINE

## 2021-02-15 PROCEDURE — 99214 OFFICE O/P EST MOD 30 MIN: CPT | Performed by: INTERNAL MEDICINE

## 2021-02-15 PROCEDURE — 3079F DIAST BP 80-89 MM HG: CPT | Performed by: INTERNAL MEDICINE

## 2021-02-15 RX ORDER — OMEPRAZOLE 40 MG/1
40 CAPSULE, DELAYED RELEASE ORAL DAILY
Qty: 90 CAPSULE | Refills: 1 | Status: SHIPPED | OUTPATIENT
Start: 2021-02-15

## 2021-02-15 RX ORDER — MELOXICAM 15 MG/1
15 TABLET ORAL DAILY
COMMUNITY
Start: 2020-12-22 | End: 2021-02-15

## 2021-02-15 RX ORDER — COVID-19 TEST SPECIMEN COLLECT
1 MISCELLANEOUS MISCELLANEOUS AS DIRECTED
COMMUNITY
Start: 2020-12-30 | End: 2021-02-15 | Stop reason: ALTCHOICE

## 2021-02-15 RX ORDER — COVID-19 MOLECULAR TEST ASSAY
1 KIT MISCELLANEOUS AS DIRECTED
COMMUNITY
Start: 2021-02-09 | End: 2021-02-15 | Stop reason: ALTCHOICE

## 2021-02-15 NOTE — PROGRESS NOTES
HPI:   Orin Hartman is a 60year old female who presents for a complete physical exam. . HTN  Hypercholesterolemia  Pre-diabetes  YUSEF  Stroke  Colon polyps  B/l carotid plaque  Ascending aorta ectasia, 3.5cm      Stroke history:  .  Dr Susannah Fagan has a co tablet 0   • zolpidem 5 MG Oral Tab Take 1 tablet (5 mg total) by mouth nightly as needed for Sleep. 30 tablet 0   • Levocetirizine Dihydrochloride (XYZAL) 5 MG Oral Tab Take 1 tablet (5 mg total) by mouth every evening.  90 tablet 0   • Cholecalciferol (VI ST  LUNGS: denies shortness of breath with exertion, has YUSEF- see HPI  CARDIOVASCULAR: denies chest pain on exertion  GI: denies abdominal pain,denies heartburn, change in bm's, bloody stools,   : denies dysuria, vaginal discharge or itching,  MUSCULOSKE subtle left facial droop,peripheral motor and sensory are grossly intact,       ASSESSMENT AND PLAN:   Lucy Leonard is a 60year old female who presents for a complete physical exam.  Pap and pelvic done.  Order put in for mammogram .  Appropriate lab in 6 months      Lipid in 6 months      Hemoglobin A1C in 6 months      There are no Patient Instructions on file for this visit.     Meds & Refills for this Visit:  Requested Prescriptions     Signed Prescriptions Disp Refills   • Omeprazole 40 MG Oral Cap

## 2021-02-18 LAB — HPV I/H RISK 1 DNA SPEC QL NAA+PROBE: NEGATIVE

## 2021-04-09 ENCOUNTER — HOSPITAL ENCOUNTER (OUTPATIENT)
Dept: GENERAL RADIOLOGY | Age: 66
Discharge: HOME | End: 2021-04-09
Attending: INTERNAL MEDICINE
Payer: COMMERCIAL

## 2021-04-09 ENCOUNTER — OFFICE VISIT (OUTPATIENT)
Dept: INTERNAL MEDICINE CLINIC | Facility: CLINIC | Age: 66
End: 2021-04-09
Payer: COMMERCIAL

## 2021-04-09 VITALS
HEART RATE: 107 BPM | HEIGHT: 60.63 IN | OXYGEN SATURATION: 98 % | WEIGHT: 150.31 LBS | TEMPERATURE: 99 F | SYSTOLIC BLOOD PRESSURE: 122 MMHG | BODY MASS INDEX: 28.75 KG/M2 | DIASTOLIC BLOOD PRESSURE: 72 MMHG | RESPIRATION RATE: 14 BRPM

## 2021-04-09 DIAGNOSIS — G89.29 CHRONIC LEFT-SIDED LOW BACK PAIN WITH LEFT-SIDED SCIATICA: ICD-10-CM

## 2021-04-09 DIAGNOSIS — M79.605 LEFT LEG PAIN: ICD-10-CM

## 2021-04-09 DIAGNOSIS — M25.552 LEFT HIP PAIN: ICD-10-CM

## 2021-04-09 DIAGNOSIS — M25.512 CHRONIC LEFT SHOULDER PAIN: ICD-10-CM

## 2021-04-09 DIAGNOSIS — M79.605 LEFT LEG PAIN: Primary | ICD-10-CM

## 2021-04-09 DIAGNOSIS — G89.29 CHRONIC LEFT SHOULDER PAIN: ICD-10-CM

## 2021-04-09 DIAGNOSIS — M54.42 CHRONIC LEFT-SIDED LOW BACK PAIN WITH LEFT-SIDED SCIATICA: ICD-10-CM

## 2021-04-09 PROCEDURE — 3008F BODY MASS INDEX DOCD: CPT | Performed by: INTERNAL MEDICINE

## 2021-04-09 PROCEDURE — 3074F SYST BP LT 130 MM HG: CPT | Performed by: INTERNAL MEDICINE

## 2021-04-09 PROCEDURE — 99214 OFFICE O/P EST MOD 30 MIN: CPT | Performed by: INTERNAL MEDICINE

## 2021-04-09 PROCEDURE — 72110 X-RAY EXAM L-2 SPINE 4/>VWS: CPT | Performed by: INTERNAL MEDICINE

## 2021-04-09 PROCEDURE — 73502 X-RAY EXAM HIP UNI 2-3 VIEWS: CPT | Performed by: INTERNAL MEDICINE

## 2021-04-09 PROCEDURE — 3078F DIAST BP <80 MM HG: CPT | Performed by: INTERNAL MEDICINE

## 2021-04-09 RX ORDER — ROSUVASTATIN CALCIUM 10 MG/1
10 TABLET, COATED ORAL DAILY
Qty: 90 TABLET | Refills: 3 | Status: SHIPPED | OUTPATIENT
Start: 2021-04-09 | End: 2021-10-05

## 2021-04-09 RX ORDER — VALSARTAN 80 MG/1
80 TABLET ORAL DAILY
Qty: 90 TABLET | Refills: 3 | Status: SHIPPED | OUTPATIENT
Start: 2021-04-09 | End: 2021-12-06

## 2021-04-09 RX ORDER — CELECOXIB 200 MG/1
200 CAPSULE ORAL DAILY
Qty: 30 CAPSULE | Refills: 0 | Status: SHIPPED | OUTPATIENT
Start: 2021-04-09 | End: 2021-10-15 | Stop reason: ALTCHOICE

## 2021-04-09 NOTE — PROGRESS NOTES
Sami Crandall is a 60year old female  Patient presents with:  Shoulder Pain: Left - limited range of motion at times  Hip Pain: Left - gets worse when laying down      HPI:   Left shoulder pain progressive 2 mos, no history  Hurts mostly at night, now Spondylarthrosis     BMI 28.0-28.9,adult     Ascending aorta dilation (HCC)     Carotid artery plaque, bilateral     Gastroesophageal reflux disease without esophagitis     Social History:  Social History    Tobacco Use      Smoking status: Never Smoker THINPREP PAP WITH HPV REFLEX REQUEST   Result Value Ref Range    Interpretation/Result Negative for intraepithelial lesion or malignancy Negative for intraepithelial lesion or malignancy    Specimen Adequacy       Satisfactory for evaluation.  Dolores Mendenhall and hip films and recommend PT for sciatica, try celebrex for 1 month, risks d/w pt  Left hip pain- see abaove  Chronic left-sided low back pain with left-sided sciatica- see above  Chronic left shoulder pain- PT    No orders of the defined types were plac

## 2021-06-16 RX ORDER — LEVOCETIRIZINE DIHYDROCHLORIDE 5 MG/1
TABLET, FILM COATED ORAL
Qty: 90 TABLET | Refills: 0 | Status: SHIPPED | OUTPATIENT
Start: 2021-06-16

## 2021-06-16 RX ORDER — ZOLPIDEM TARTRATE 5 MG/1
TABLET ORAL
Qty: 30 TABLET | Refills: 0 | Status: SHIPPED | OUTPATIENT
Start: 2021-06-16

## 2021-06-16 NOTE — TELEPHONE ENCOUNTER
Last OV relevant to medication: 2/15/21  Last refill date: 10/28/2020     #/refills: 30/0  When pt was asked to return for OV: 6 months  Upcoming appt/reason: 8/17/21, multiple issues  Was pt informed of any over due labs: none; labs due prior to upcoming

## 2021-06-23 RX ORDER — GLUCOSAMINE HCL 500 MG
1 TABLET ORAL DAILY
Qty: 90 TABLET | Refills: 0 | OUTPATIENT
Start: 2021-06-23

## 2021-06-23 RX ORDER — OMEPRAZOLE 40 MG/1
40 CAPSULE, DELAYED RELEASE ORAL DAILY
Qty: 90 CAPSULE | Refills: 1 | OUTPATIENT
Start: 2021-06-23

## 2021-06-23 NOTE — TELEPHONE ENCOUNTER
Vitamin D3  Last OV relevant to medication: 04/09/2021  Last refill date: 11/26/2019     #/refills: 30/0  When pt was asked to return for OV: Nothing specified  Upcoming appt/reason: 08/17/2021  Follow Up Multi Issues  Was pt informed of any over due l

## 2021-06-30 ENCOUNTER — TELEPHONE (OUTPATIENT)
Dept: INTERNAL MEDICINE CLINIC | Facility: CLINIC | Age: 66
End: 2021-06-30

## 2021-06-30 NOTE — TELEPHONE ENCOUNTER
Component      Latest Ref Rng & Units 8/29/2017   WBC      4.2 - 11.0 K/mcL 5.1   RBC      4.00 - 5.20 mil/mcL 4.12   HGB      12.0 - 15.5 g/dL 13.9   HCT      36.0 - 46.5 % 40.2   MCV      78.0 - 100.0 fl 97.6   MCH      26.0 - 34.0 pg 33.7   MCHC      32.0 - 36.5 g/dL 34.6   RDW-CV      11.0 - 15.0 % 12.3   PLT      140 - 450 K/mcL 229   DIFFERENTIAL TYPE       AUTOMATED DIFFERENTIAL   LYMPH      % 40   MID (MONO,EO&BASO)      % 9   Neutrophil      % 51   Absolute Lymph      1.0 - 4.8 K/mcL 2.1   Absolute Mid      0.1 - 1.1 K/mcL 0.5   Absolute Neutrophil      1.8 - 7.7 K/mcL 2.5   Fasting Status      hrs 8   Sodium      135 - 145 mmol/L 135   Potassium      3.4 - 5.1 mmol/L 4.2   Chloride      98 - 107 mmol/L 100   CO2      21 - 32 mmol/L 29   ANION GAP      10 - 20 mmol/L 10   Glucose      65 - 99 mg/dL 89   BUN      6 - 20 mg/dL 13   Creatinine      0.51 - 0.95 mg/dL 0.74   GFR Estimate,        >90   GFR Estimate, Non        >90   BUN/CREATININE RATIO      7 - 25 18   CALCIUM      8.4 - 10.2 mg/dL 9.4   TOTAL BILIRUBIN      0.2 - 1.0 mg/dL 0.9   AST/SGOT      <38 Units/L 22   ALT/SGPT      <79 Units/L 37   ALK PHOSPHATASE      45 - 117 Units/L 70   TOTAL PROTEIN      6.4 - 8.2 g/dL 6.9   Albumin      3.6 - 5.1 g/dL 4.2   GLOBULIN      2.0 - 4.0 g/dL 2.7   A/G Ratio, Serum      1.0 - 2.4 1.6   GLYCOHEMOGLOBIN A1C      4.5 - 5.6 % 5.7 (H)   VITAMIND, 25 HYDROXY      30.0 - 100.0 ng/mL 96.8   B. BURGDORFERI AB SCREEN      NEGATIVE NEGATIVE   TSH      0.350 - 5.000 mcUnits/mL 1.879     Attempt to convey results.  Left message for patient to call back [phone].     Rec'd letter from [facility]. Reviewed and signed by NINA Herrera. Sent to scan.

## 2021-07-13 ENCOUNTER — MED REC SCAN ONLY (OUTPATIENT)
Dept: INTERNAL MEDICINE CLINIC | Facility: CLINIC | Age: 66
End: 2021-07-13

## 2021-10-01 ENCOUNTER — LAB ENCOUNTER (OUTPATIENT)
Dept: LAB | Age: 66
End: 2021-10-01
Attending: INTERNAL MEDICINE
Payer: MEDICARE

## 2021-10-01 DIAGNOSIS — I65.23 CAROTID ARTERY PLAQUE, BILATERAL: ICD-10-CM

## 2021-10-01 DIAGNOSIS — R73.03 PRE-DIABETES: ICD-10-CM

## 2021-10-01 PROCEDURE — 83036 HEMOGLOBIN GLYCOSYLATED A1C: CPT

## 2021-10-01 PROCEDURE — 36415 COLL VENOUS BLD VENIPUNCTURE: CPT

## 2021-10-01 PROCEDURE — 80053 COMPREHEN METABOLIC PANEL: CPT

## 2021-10-01 PROCEDURE — 80061 LIPID PANEL: CPT

## 2021-10-05 ENCOUNTER — OFFICE VISIT (OUTPATIENT)
Dept: INTERNAL MEDICINE CLINIC | Facility: CLINIC | Age: 66
End: 2021-10-05
Payer: MEDICARE

## 2021-10-05 VITALS
BODY MASS INDEX: 28.18 KG/M2 | RESPIRATION RATE: 14 BRPM | DIASTOLIC BLOOD PRESSURE: 74 MMHG | HEIGHT: 60.63 IN | OXYGEN SATURATION: 95 % | TEMPERATURE: 98 F | WEIGHT: 147.31 LBS | SYSTOLIC BLOOD PRESSURE: 118 MMHG | HEART RATE: 90 BPM

## 2021-10-05 DIAGNOSIS — I10 PRIMARY HYPERTENSION: ICD-10-CM

## 2021-10-05 DIAGNOSIS — E78.2 MIXED HYPERLIPIDEMIA: ICD-10-CM

## 2021-10-05 DIAGNOSIS — Z23 NEED FOR VACCINATION: ICD-10-CM

## 2021-10-05 DIAGNOSIS — Z86.32 HISTORY OF GESTATIONAL DIABETES MELLITUS (GDM): ICD-10-CM

## 2021-10-05 DIAGNOSIS — K21.9 GASTROESOPHAGEAL REFLUX DISEASE WITHOUT ESOPHAGITIS: ICD-10-CM

## 2021-10-05 DIAGNOSIS — I65.23 CAROTID ARTERY PLAQUE, BILATERAL: ICD-10-CM

## 2021-10-05 DIAGNOSIS — I77.810 ASCENDING AORTA DILATION (HCC): ICD-10-CM

## 2021-10-05 DIAGNOSIS — I69.30 HISTORY OF CVA WITH RESIDUAL DEFICIT: ICD-10-CM

## 2021-10-05 DIAGNOSIS — G47.33 OSA (OBSTRUCTIVE SLEEP APNEA): ICD-10-CM

## 2021-10-05 DIAGNOSIS — R73.03 PRE-DIABETES: Primary | ICD-10-CM

## 2021-10-05 PROCEDURE — 90732 PPSV23 VACC 2 YRS+ SUBQ/IM: CPT | Performed by: INTERNAL MEDICINE

## 2021-10-05 PROCEDURE — 99214 OFFICE O/P EST MOD 30 MIN: CPT | Performed by: INTERNAL MEDICINE

## 2021-10-05 PROCEDURE — G0009 ADMIN PNEUMOCOCCAL VACCINE: HCPCS | Performed by: INTERNAL MEDICINE

## 2021-10-05 RX ORDER — ROSUVASTATIN CALCIUM 20 MG/1
20 TABLET, COATED ORAL NIGHTLY
Qty: 90 TABLET | Refills: 1 | Status: SHIPPED | OUTPATIENT
Start: 2021-10-05

## 2021-10-05 RX ORDER — METFORMIN HYDROCHLORIDE 500 MG/1
500 TABLET, EXTENDED RELEASE ORAL DAILY
Qty: 90 TABLET | Refills: 0 | Status: SHIPPED | OUTPATIENT
Start: 2021-10-05

## 2021-10-05 NOTE — PROGRESS NOTES
Sami Crandall is a 73year old female.  To F/U from last visit regarding multiple metabolic issues and medications and f/u, see next  HPI:   HTN  Hypercholesterolemia  Pre-diabetes  YUSEF  Stroke  Colon polyps  B/l carotid plaque  Ascending aorta ectasia, Nasal route as needed. • Aspirin 81 MG Oral Tab Take 81 mg by mouth daily.            Social History:  Social History    Tobacco Use      Smoking status: Never Smoker      Smokeless tobacco: Never Used    Vaping Use      Vaping Use: Never used    [place] (H) <130 mg/dL    FASTING Patient not present    HEMOGLOBIN A1C   Result Value Ref Range    HgbA1C 6.6 (H) <5.7 %    Estimated Average Glucose 143 (H) 68 - 126 mg/dL         ASSESSMENT AND PLAN:   Pre-diabetes  (primary encounter diagnosis) progressing, st

## 2021-10-15 ENCOUNTER — TELEMEDICINE (OUTPATIENT)
Dept: INTERNAL MEDICINE CLINIC | Facility: CLINIC | Age: 66
End: 2021-10-15

## 2021-10-15 ENCOUNTER — TELEPHONE (OUTPATIENT)
Dept: INTERNAL MEDICINE CLINIC | Facility: CLINIC | Age: 66
End: 2021-10-15

## 2021-10-15 ENCOUNTER — HOSPITAL ENCOUNTER (OUTPATIENT)
Dept: GENERAL RADIOLOGY | Age: 66
Discharge: HOME | End: 2021-10-15
Attending: INTERNAL MEDICINE
Payer: MEDICARE

## 2021-10-15 ENCOUNTER — LAB ENCOUNTER (OUTPATIENT)
Dept: LAB | Age: 66
End: 2021-10-15
Attending: INTERNAL MEDICINE
Payer: MEDICARE

## 2021-10-15 DIAGNOSIS — R50.9 FEVER AND CHILLS: ICD-10-CM

## 2021-10-15 DIAGNOSIS — R50.9 FEVER AND CHILLS: Primary | ICD-10-CM

## 2021-10-15 PROCEDURE — 36415 COLL VENOUS BLD VENIPUNCTURE: CPT

## 2021-10-15 PROCEDURE — 87186 SC STD MICRODIL/AGAR DIL: CPT

## 2021-10-15 PROCEDURE — 87077 CULTURE AEROBIC IDENTIFY: CPT

## 2021-10-15 PROCEDURE — 85025 COMPLETE CBC W/AUTO DIFF WBC: CPT

## 2021-10-15 PROCEDURE — 99214 OFFICE O/P EST MOD 30 MIN: CPT | Performed by: INTERNAL MEDICINE

## 2021-10-15 PROCEDURE — 87086 URINE CULTURE/COLONY COUNT: CPT

## 2021-10-15 PROCEDURE — 81001 URINALYSIS AUTO W/SCOPE: CPT

## 2021-10-15 PROCEDURE — 71046 X-RAY EXAM CHEST 2 VIEWS: CPT | Performed by: INTERNAL MEDICINE

## 2021-10-15 RX ORDER — MULTIVIT-MIN/IRON/FOLIC ACID/K 18-600-40
CAPSULE ORAL DAILY
COMMUNITY

## 2021-10-15 RX ORDER — ACETAMINOPHEN 160 MG
2000 TABLET,DISINTEGRATING ORAL DAILY
COMMUNITY

## 2021-10-15 NOTE — TELEPHONE ENCOUNTER
Started having HA over the  weekend    Monday body ache, fatigue, fever, sweats so went for pcr test but resulted negative  Started to have high fever of 102 on Tuesday went for Rapid test and was negative  Pt is taking Tylenol and aleve for fever and pain

## 2021-10-15 NOTE — PROGRESS NOTES
This visit is conducted using Telemedicine with live, interactive video and audio. Patient has been referred to the [facility] website at [email or web address] to review the yearly Consent to Treat document.     Patient understands and accepts financial res Carb-Cholecalciferol (CALCIUM + D3) 600-200 MG-UNIT Oral Tab Take 1 tablet by mouth daily. • Fluticasone Furoate 27.5 MCG/SPRAY Nasal Suspension 2 sprays by Nasal route as needed. • Aspirin 81 MG Oral Tab Take 81 mg by mouth daily.      • Gilbert by KEITH Blanco) [E]      Meds & Refills for this Visit:  Requested Prescriptions      No prescriptions requested or ordered in this encounter       Imaging & Consults:  XR CHEST PA + LAT CHEST (CPT=71046)    No follow-ups on file.     There are no Patient

## 2021-11-18 ENCOUNTER — MED REC SCAN ONLY (OUTPATIENT)
Dept: INTERNAL MEDICINE CLINIC | Facility: CLINIC | Age: 66
End: 2021-11-18

## 2021-12-06 ENCOUNTER — HOSPITAL ENCOUNTER (OUTPATIENT)
Dept: MAMMOGRAPHY | Age: 66
Discharge: HOME | End: 2021-12-06
Attending: INTERNAL MEDICINE
Payer: MEDICARE

## 2021-12-06 DIAGNOSIS — Z12.31 BREAST CANCER SCREENING BY MAMMOGRAM: ICD-10-CM

## 2021-12-06 DIAGNOSIS — I10 PRIMARY HYPERTENSION: Primary | ICD-10-CM

## 2021-12-06 PROCEDURE — 77067 SCR MAMMO BI INCL CAD: CPT | Performed by: INTERNAL MEDICINE

## 2021-12-06 PROCEDURE — 77063 BREAST TOMOSYNTHESIS BI: CPT | Performed by: INTERNAL MEDICINE

## 2021-12-08 RX ORDER — VALSARTAN 80 MG/1
80 TABLET ORAL DAILY
Qty: 90 TABLET | Refills: 0 | Status: SHIPPED | OUTPATIENT
Start: 2021-12-08

## 2021-12-15 ENCOUNTER — HOSPITAL ENCOUNTER (OUTPATIENT)
Dept: MAMMOGRAPHY | Age: 66
Discharge: HOME | End: 2021-12-15
Attending: INTERNAL MEDICINE
Payer: MEDICARE

## 2021-12-15 DIAGNOSIS — R92.2 INCONCLUSIVE MAMMOGRAM: ICD-10-CM

## 2021-12-15 PROCEDURE — 77061 BREAST TOMOSYNTHESIS UNI: CPT | Performed by: INTERNAL MEDICINE

## 2021-12-15 PROCEDURE — 77065 DX MAMMO INCL CAD UNI: CPT | Performed by: INTERNAL MEDICINE

## 2021-12-16 DIAGNOSIS — R92.8 ABNORMAL MAMMOGRAM: Primary | ICD-10-CM

## 2021-12-22 ENCOUNTER — LAB ENCOUNTER (OUTPATIENT)
Dept: LAB | Age: 66
End: 2021-12-22
Attending: INTERNAL MEDICINE
Payer: MEDICARE

## 2021-12-22 DIAGNOSIS — R73.03 PRE-DIABETES: ICD-10-CM

## 2021-12-22 DIAGNOSIS — E78.2 MIXED HYPERLIPIDEMIA: ICD-10-CM

## 2021-12-22 PROCEDURE — 80061 LIPID PANEL: CPT

## 2021-12-22 PROCEDURE — 36415 COLL VENOUS BLD VENIPUNCTURE: CPT

## 2021-12-22 PROCEDURE — 83036 HEMOGLOBIN GLYCOSYLATED A1C: CPT

## 2021-12-22 PROCEDURE — 80053 COMPREHEN METABOLIC PANEL: CPT

## 2021-12-27 PROBLEM — E11.9 TYPE 2 DIABETES MELLITUS WITHOUT COMPLICATION, WITHOUT LONG-TERM CURRENT USE OF INSULIN (HCC): Status: ACTIVE | Noted: 2021-12-27

## 2021-12-29 DIAGNOSIS — E11.9 TYPE 2 DIABETES MELLITUS WITHOUT COMPLICATION, WITHOUT LONG-TERM CURRENT USE OF INSULIN (HCC): ICD-10-CM

## 2021-12-29 DIAGNOSIS — E11.69 TYPE 2 DIABETES MELLITUS WITH OTHER SPECIFIED COMPLICATION, WITHOUT LONG-TERM CURRENT USE OF INSULIN (HCC): Primary | ICD-10-CM

## 2022-01-10 ENCOUNTER — TELEPHONE (OUTPATIENT)
Dept: INTERNAL MEDICINE CLINIC | Facility: CLINIC | Age: 67
End: 2022-01-10

## 2022-02-01 RX ORDER — ROSUVASTATIN CALCIUM 10 MG/1
TABLET, COATED ORAL
Qty: 90 TABLET | Refills: 0 | OUTPATIENT
Start: 2022-02-01

## 2022-03-01 RX ORDER — VALSARTAN 80 MG/1
TABLET ORAL
Qty: 90 TABLET | Refills: 0 | Status: SHIPPED | OUTPATIENT
Start: 2022-03-01

## 2022-04-12 ENCOUNTER — LAB ENCOUNTER (OUTPATIENT)
Dept: LAB | Age: 67
End: 2022-04-12
Attending: NURSE PRACTITIONER
Payer: MEDICARE

## 2022-04-12 ENCOUNTER — TELEMEDICINE (OUTPATIENT)
Dept: INTERNAL MEDICINE CLINIC | Facility: CLINIC | Age: 67
End: 2022-04-12

## 2022-04-12 DIAGNOSIS — B34.9 ACUTE VIRAL SYNDROME: ICD-10-CM

## 2022-04-12 DIAGNOSIS — R39.9 UTI SYMPTOMS: Primary | ICD-10-CM

## 2022-04-12 DIAGNOSIS — R39.9 UTI SYMPTOMS: ICD-10-CM

## 2022-04-12 LAB
BILIRUB UR QL STRIP.AUTO: NEGATIVE
COLOR UR AUTO: YELLOW
GLUCOSE UR STRIP.AUTO-MCNC: NEGATIVE MG/DL
KETONES UR STRIP.AUTO-MCNC: NEGATIVE MG/DL
NITRITE UR QL STRIP.AUTO: NEGATIVE
PH UR STRIP.AUTO: 6 [PH] (ref 5–8)
PROT UR STRIP.AUTO-MCNC: NEGATIVE MG/DL
SP GR UR STRIP.AUTO: 1.01 (ref 1–1.03)
UROBILINOGEN UR STRIP.AUTO-MCNC: <2 MG/DL
WBC #/AREA URNS AUTO: >50 /HPF

## 2022-04-12 PROCEDURE — 99214 OFFICE O/P EST MOD 30 MIN: CPT | Performed by: NURSE PRACTITIONER

## 2022-04-12 PROCEDURE — 87086 URINE CULTURE/COLONY COUNT: CPT

## 2022-04-12 PROCEDURE — 87088 URINE BACTERIA CULTURE: CPT

## 2022-04-12 PROCEDURE — 87186 SC STD MICRODIL/AGAR DIL: CPT

## 2022-04-12 PROCEDURE — 81001 URINALYSIS AUTO W/SCOPE: CPT

## 2022-04-12 RX ORDER — SULFAMETHOXAZOLE AND TRIMETHOPRIM 800; 160 MG/1; MG/1
1 TABLET ORAL 2 TIMES DAILY
Qty: 14 TABLET | Refills: 0 | Status: SHIPPED | OUTPATIENT
Start: 2022-04-12 | End: 2022-04-19

## 2022-04-13 ENCOUNTER — TELEPHONE (OUTPATIENT)
Dept: INTERNAL MEDICINE CLINIC | Facility: CLINIC | Age: 67
End: 2022-04-13

## 2022-04-13 ENCOUNTER — TELEMEDICINE (OUTPATIENT)
Dept: INTERNAL MEDICINE CLINIC | Facility: CLINIC | Age: 67
End: 2022-04-13
Payer: MEDICARE

## 2022-04-13 VITALS — BODY MASS INDEX: 28 KG/M2 | HEIGHT: 60.63 IN

## 2022-04-13 DIAGNOSIS — U07.1 COVID-19 VIRUS INFECTION: Primary | ICD-10-CM

## 2022-04-13 DIAGNOSIS — S09.90XD INJURY OF HEAD, SUBSEQUENT ENCOUNTER: ICD-10-CM

## 2022-04-13 DIAGNOSIS — N30.00 ACUTE CYSTITIS WITHOUT HEMATURIA: ICD-10-CM

## 2022-04-13 DIAGNOSIS — R55 SYNCOPE, UNSPECIFIED SYNCOPE TYPE: ICD-10-CM

## 2022-04-13 LAB — SARS-COV-2 RNA RESP QL NAA+PROBE: DETECTED

## 2022-04-13 PROCEDURE — 99214 OFFICE O/P EST MOD 30 MIN: CPT | Performed by: NURSE PRACTITIONER

## 2022-04-13 NOTE — TELEPHONE ENCOUNTER
Patient was told she would need to schedule virtual visit if she would like to proceed with medication. Patient notified, verbalized understanding. Future Appointments   Date Time Provider Wes Humphrey   4/13/2022 11:20 AM NINA Avila [facility] [place]     Patient scheduled for video visit. Patient advised to complete the e-check in in [facility], if active. Understands to follow the prompts and links to complete the visit. Patient advised that there may be a co-pay involved in this type of visit. Patient agreed to proceed, they understand the provider may be calling from a blocked, or unknown phone number on their caller ID and they know to answer the phone.     Best call back:  [phone]

## 2022-04-13 NOTE — TELEPHONE ENCOUNTER
Per Eleno Rajeev, APN sent fax request for medical records to [facility] - K#[phone] and EST#[phone]. Rec'd fax confirmation.

## 2022-04-13 NOTE — TELEPHONE ENCOUNTER
Pt stated that she just tested positive for covid and Pt was called with results and was given the choice of infusion or the pills. Pt would like to do the pills instead of the infusion.    Please send Rx to [facility]/pharmacy #[id number]- [address] [facility], [address] [place] [address],[address] [place] [address]. [phone], [phone]  Sending high priority due to timing   Thank you

## 2022-04-14 NOTE — TELEPHONE ENCOUNTER
Incoming (mail or fax):  fax  [facility]  Documentation given to:  [address] [facility] [address] records from [facility]

## 2022-04-14 NOTE — TELEPHONE ENCOUNTER
Rec'd Medical Records from [address] [place] [address] in [facility]'s in folder for review and sign off.

## 2022-05-23 ENCOUNTER — TELEPHONE (OUTPATIENT)
Dept: INTERNAL MEDICINE CLINIC | Facility: CLINIC | Age: 67
End: 2022-05-23

## 2022-05-23 NOTE — TELEPHONE ENCOUNTER
Pt called reporting high bp readings. Her bp yesterday morning 8am 156/105 left arm, sitting  This morning bp was 149/97 left arm, sitting  Pt reported her bp machine has been a bit wonky    Has had a dull headache, no vision changes   Yesterday was slightly light headed, is feeling fine this morning    No pain, numbness tingling, no fever, n/v/d  No weakness    Would you like pt to come in for nurse visit for bp calibration appt or come in to be seen by provider?   Please advise     15 years ago had right sided stroke, left side of body affected    Pt reported Recurring uti's with no s/s at this time     Pt reported allergy to bactrim - tongue swelling and rash - relieved by 2 benadryl - updated allergies list

## 2022-05-23 NOTE — TELEPHONE ENCOUNTER
Left detailed message for patient to call back and schedule visit and to bring blood pressure cuff. (okay per KYRA).

## 2022-05-24 ENCOUNTER — OFFICE VISIT (OUTPATIENT)
Dept: INTERNAL MEDICINE CLINIC | Facility: CLINIC | Age: 67
End: 2022-05-24
Payer: MEDICARE

## 2022-05-24 VITALS
RESPIRATION RATE: 14 BRPM | SYSTOLIC BLOOD PRESSURE: 146 MMHG | HEIGHT: 60.63 IN | OXYGEN SATURATION: 97 % | DIASTOLIC BLOOD PRESSURE: 84 MMHG | HEART RATE: 105 BPM | BODY MASS INDEX: 27.95 KG/M2 | TEMPERATURE: 97 F | WEIGHT: 146.13 LBS

## 2022-05-24 DIAGNOSIS — I69.30 HISTORY OF CVA WITH RESIDUAL DEFICIT: ICD-10-CM

## 2022-05-24 DIAGNOSIS — E11.9 TYPE 2 DIABETES MELLITUS WITHOUT COMPLICATION, WITHOUT LONG-TERM CURRENT USE OF INSULIN (HCC): ICD-10-CM

## 2022-05-24 DIAGNOSIS — E78.2 MIXED HYPERLIPIDEMIA: ICD-10-CM

## 2022-05-24 DIAGNOSIS — R14.2 BELCHING: ICD-10-CM

## 2022-05-24 DIAGNOSIS — I10 PRIMARY HYPERTENSION: Primary | ICD-10-CM

## 2022-05-24 DIAGNOSIS — R21 RASH: ICD-10-CM

## 2022-05-24 PROCEDURE — 99214 OFFICE O/P EST MOD 30 MIN: CPT | Performed by: INTERNAL MEDICINE

## 2022-05-24 RX ORDER — ROSUVASTATIN CALCIUM 10 MG/1
1 TABLET, COATED ORAL NIGHTLY
COMMUNITY
Start: 2022-05-13 | End: 2022-05-24 | Stop reason: CLARIF

## 2022-05-24 RX ORDER — ROSUVASTATIN CALCIUM 20 MG/1
10 TABLET, COATED ORAL NIGHTLY
Refills: 1 | COMMUNITY
Start: 2022-05-24

## 2022-05-24 RX ORDER — VALSARTAN 160 MG/1
160 TABLET ORAL DAILY
Qty: 90 TABLET | Refills: 0 | Status: SHIPPED | OUTPATIENT
Start: 2022-05-24

## 2022-05-24 NOTE — PATIENT INSTRUCTIONS
Please check your blood pressure at home every morning and avg the results of 3 readings in a row if first one elevated. Elevated readings would be >596 systolic (top number) or >13 diastolic (bottom number). Record every morning for 2 weeks and get results to me by phone, fax, drop-off or office visit   I define good blood pressure when your blood pressure meets the guidelines at least 85% of the time. If you can target a sodium intake of less than 2500 mg daily, this is the equivalent of being on a blood pressure medication. Potassium rich foods can also lower your blood pressure. Weight loss can also lower blood pressure.

## 2022-05-25 NOTE — TELEPHONE ENCOUNTER
Dr. Rachael Johnson already saw the pt for this yesterday - this has been addressed and pt understands dr Teri Rivera instructions

## 2022-06-01 ENCOUNTER — TELEPHONE (OUTPATIENT)
Dept: INTERNAL MEDICINE CLINIC | Facility: CLINIC | Age: 67
End: 2022-06-01

## 2022-06-02 ENCOUNTER — LAB ENCOUNTER (OUTPATIENT)
Dept: LAB | Age: 67
End: 2022-06-02
Attending: INTERNAL MEDICINE
Payer: MEDICARE

## 2022-06-02 DIAGNOSIS — E11.9 TYPE 2 DIABETES MELLITUS WITHOUT COMPLICATION, WITHOUT LONG-TERM CURRENT USE OF INSULIN (HCC): ICD-10-CM

## 2022-06-02 LAB
ALBUMIN SERPL-MCNC: 3.9 G/DL (ref 3.4–5)
ALBUMIN/GLOB SERPL: 1.2 {RATIO} (ref 1–2)
ALP LIVER SERPL-CCNC: 75 U/L
ALT SERPL-CCNC: 27 U/L
ANION GAP SERPL CALC-SCNC: 3 MMOL/L (ref 0–18)
AST SERPL-CCNC: 19 U/L (ref 15–37)
BILIRUB SERPL-MCNC: 0.4 MG/DL (ref 0.1–2)
BUN BLD-MCNC: 12 MG/DL (ref 7–18)
CALCIUM BLD-MCNC: 9.5 MG/DL (ref 8.5–10.1)
CHLORIDE SERPL-SCNC: 107 MMOL/L (ref 98–112)
CHOLEST SERPL-MCNC: 219 MG/DL (ref ?–200)
CO2 SERPL-SCNC: 30 MMOL/L (ref 21–32)
CREAT BLD-MCNC: 0.84 MG/DL
EST. AVERAGE GLUCOSE BLD GHB EST-MCNC: 143 MG/DL (ref 68–126)
FASTING PATIENT LIPID ANSWER: YES
FASTING STATUS PATIENT QL REPORTED: YES
GLOBULIN PLAS-MCNC: 3.3 G/DL (ref 2.8–4.4)
GLUCOSE BLD-MCNC: 116 MG/DL (ref 70–99)
HBA1C MFR BLD: 6.6 % (ref ?–5.7)
HDLC SERPL-MCNC: 50 MG/DL (ref 40–59)
LDLC SERPL CALC-MCNC: 119 MG/DL (ref ?–100)
NONHDLC SERPL-MCNC: 169 MG/DL (ref ?–130)
OSMOLALITY SERPL CALC.SUM OF ELEC: 291 MOSM/KG (ref 275–295)
POTASSIUM SERPL-SCNC: 4.4 MMOL/L (ref 3.5–5.1)
PROT SERPL-MCNC: 7.2 G/DL (ref 6.4–8.2)
SODIUM SERPL-SCNC: 140 MMOL/L (ref 136–145)
TRIGL SERPL-MCNC: 285 MG/DL (ref 30–149)
VLDLC SERPL CALC-MCNC: 51 MG/DL (ref 0–30)

## 2022-06-02 PROCEDURE — 36415 COLL VENOUS BLD VENIPUNCTURE: CPT

## 2022-06-02 PROCEDURE — 80053 COMPREHEN METABOLIC PANEL: CPT

## 2022-06-02 PROCEDURE — 80061 LIPID PANEL: CPT

## 2022-06-02 PROCEDURE — 83036 HEMOGLOBIN GLYCOSYLATED A1C: CPT

## 2022-06-07 ENCOUNTER — OFFICE VISIT (OUTPATIENT)
Dept: INTERNAL MEDICINE CLINIC | Facility: CLINIC | Age: 67
End: 2022-06-07
Payer: MEDICARE

## 2022-06-07 VITALS
HEART RATE: 104 BPM | WEIGHT: 145.69 LBS | OXYGEN SATURATION: 98 % | DIASTOLIC BLOOD PRESSURE: 76 MMHG | SYSTOLIC BLOOD PRESSURE: 128 MMHG | HEIGHT: 60.55 IN | TEMPERATURE: 97 F | BODY MASS INDEX: 27.86 KG/M2 | RESPIRATION RATE: 14 BRPM

## 2022-06-07 DIAGNOSIS — I65.23 CAROTID ARTERY PLAQUE, BILATERAL: ICD-10-CM

## 2022-06-07 DIAGNOSIS — I69.30 HISTORY OF CVA WITH RESIDUAL DEFICIT: ICD-10-CM

## 2022-06-07 DIAGNOSIS — K21.9 GASTROESOPHAGEAL REFLUX DISEASE WITHOUT ESOPHAGITIS: ICD-10-CM

## 2022-06-07 DIAGNOSIS — Z00.00 ENCOUNTER FOR ANNUAL HEALTH EXAMINATION: Primary | ICD-10-CM

## 2022-06-07 DIAGNOSIS — I10 PRIMARY HYPERTENSION: ICD-10-CM

## 2022-06-07 DIAGNOSIS — R79.89 LOW VITAMIN D LEVEL: ICD-10-CM

## 2022-06-07 DIAGNOSIS — F51.01 PRIMARY INSOMNIA: ICD-10-CM

## 2022-06-07 DIAGNOSIS — I70.0 ATHEROSCLEROSIS OF AORTA (HCC): ICD-10-CM

## 2022-06-07 DIAGNOSIS — I77.810 ASCENDING AORTA DILATION (HCC): ICD-10-CM

## 2022-06-07 DIAGNOSIS — Z11.59 NEED FOR HEPATITIS C SCREENING TEST: ICD-10-CM

## 2022-06-07 DIAGNOSIS — G47.33 OSA (OBSTRUCTIVE SLEEP APNEA): ICD-10-CM

## 2022-06-07 DIAGNOSIS — N28.1 RENAL CYST, LEFT: ICD-10-CM

## 2022-06-07 DIAGNOSIS — E78.2 MIXED HYPERLIPIDEMIA: ICD-10-CM

## 2022-06-07 DIAGNOSIS — Z78.0 POSTMENOPAUSAL: ICD-10-CM

## 2022-06-07 DIAGNOSIS — E11.9 TYPE 2 DIABETES MELLITUS WITHOUT COMPLICATION, WITHOUT LONG-TERM CURRENT USE OF INSULIN (HCC): ICD-10-CM

## 2022-06-07 PROBLEM — R73.03 PRE-DIABETES: Status: RESOLVED | Noted: 2017-02-10 | Resolved: 2022-06-07

## 2022-06-07 PROBLEM — Z86.32 HISTORY OF GESTATIONAL DIABETES MELLITUS (GDM): Status: RESOLVED | Noted: 2017-02-10 | Resolved: 2022-06-07

## 2022-06-07 PROCEDURE — G0403 EKG FOR INITIAL PREVENT EXAM: HCPCS | Performed by: INTERNAL MEDICINE

## 2022-06-07 PROCEDURE — G0402 INITIAL PREVENTIVE EXAM: HCPCS | Performed by: INTERNAL MEDICINE

## 2022-06-07 PROCEDURE — 99214 OFFICE O/P EST MOD 30 MIN: CPT | Performed by: INTERNAL MEDICINE

## 2022-06-07 RX ORDER — ZOLPIDEM TARTRATE 5 MG/1
5 TABLET ORAL NIGHTLY PRN
Qty: 30 TABLET | Refills: 0 | Status: SHIPPED | OUTPATIENT
Start: 2022-06-07

## 2022-06-07 RX ORDER — PRAVASTATIN SODIUM 40 MG
40 TABLET ORAL NIGHTLY
Qty: 90 TABLET | Refills: 1 | Status: SHIPPED | OUTPATIENT
Start: 2022-06-07

## 2022-06-16 ENCOUNTER — HOSPITAL ENCOUNTER (OUTPATIENT)
Dept: MAMMOGRAPHY | Facility: HOSPITAL | Age: 67
Discharge: HOME | End: 2022-06-16
Attending: INTERNAL MEDICINE
Payer: MEDICARE

## 2022-06-16 DIAGNOSIS — R92.8 ABNORMAL MAMMOGRAM: Primary | ICD-10-CM

## 2022-06-16 DIAGNOSIS — R92.8 ABNORMAL MAMMOGRAM: ICD-10-CM

## 2022-06-16 PROCEDURE — 77061 BREAST TOMOSYNTHESIS UNI: CPT | Performed by: INTERNAL MEDICINE

## 2022-06-16 PROCEDURE — 77065 DX MAMMO INCL CAD UNI: CPT | Performed by: INTERNAL MEDICINE

## 2022-07-11 ENCOUNTER — HOSPITAL ENCOUNTER (OUTPATIENT)
Dept: CV DIAGNOSTICS | Facility: HOSPITAL | Age: 67
Discharge: HOME | End: 2022-07-11
Attending: INTERNAL MEDICINE
Payer: MEDICARE

## 2022-07-11 DIAGNOSIS — I77.810 ASCENDING AORTA DILATION (HCC): ICD-10-CM

## 2022-07-11 PROCEDURE — 93306 TTE W/DOPPLER COMPLETE: CPT | Performed by: INTERNAL MEDICINE

## 2022-08-15 RX ORDER — VALSARTAN 160 MG/1
TABLET ORAL
Qty: 90 TABLET | Refills: 0 | Status: SHIPPED | OUTPATIENT
Start: 2022-08-15

## 2022-08-15 NOTE — TELEPHONE ENCOUNTER
Hypertension Medications Protocol Passed 08/13/2022 09:35 AM   Protocol Details  CMP or BMP in past 12 months    Last serum creatinine< 2.0    Appointment in past 6 or next 3 months     No future appointments.

## 2022-11-08 ENCOUNTER — OFFICE VISIT (OUTPATIENT)
Dept: INTERNAL MEDICINE CLINIC | Facility: CLINIC | Age: 67
End: 2022-11-08
Payer: MEDICARE

## 2022-11-08 VITALS
DIASTOLIC BLOOD PRESSURE: 72 MMHG | BODY MASS INDEX: 27.92 KG/M2 | RESPIRATION RATE: 12 BRPM | HEIGHT: 60.55 IN | SYSTOLIC BLOOD PRESSURE: 130 MMHG | HEART RATE: 105 BPM | TEMPERATURE: 98 F | WEIGHT: 146 LBS

## 2022-11-08 DIAGNOSIS — Z12.31 ENCOUNTER FOR SCREENING MAMMOGRAM FOR BREAST CANCER: ICD-10-CM

## 2022-11-08 DIAGNOSIS — Z11.59 NEED FOR HEPATITIS C SCREENING TEST: ICD-10-CM

## 2022-11-08 DIAGNOSIS — E11.9 TYPE 2 DIABETES MELLITUS WITHOUT COMPLICATION, WITHOUT LONG-TERM CURRENT USE OF INSULIN (HCC): Primary | ICD-10-CM

## 2022-11-08 DIAGNOSIS — I77.810 ASCENDING AORTA DILATION (HCC): ICD-10-CM

## 2022-11-08 DIAGNOSIS — R92.8 ABNORMAL MAMMOGRAM: ICD-10-CM

## 2022-11-08 DIAGNOSIS — K21.9 GASTROESOPHAGEAL REFLUX DISEASE WITHOUT ESOPHAGITIS: ICD-10-CM

## 2022-11-08 DIAGNOSIS — N28.1 RENAL CYST, LEFT: ICD-10-CM

## 2022-11-08 DIAGNOSIS — I10 PRIMARY HYPERTENSION: ICD-10-CM

## 2022-11-08 DIAGNOSIS — E78.2 MIXED HYPERLIPIDEMIA: ICD-10-CM

## 2022-11-08 PROCEDURE — 99214 OFFICE O/P EST MOD 30 MIN: CPT | Performed by: INTERNAL MEDICINE

## 2022-11-08 RX ORDER — PANTOPRAZOLE SODIUM 40 MG/1
40 TABLET, DELAYED RELEASE ORAL DAILY PRN
COMMUNITY
Start: 2022-10-26

## 2022-11-14 ENCOUNTER — TELEPHONE (OUTPATIENT)
Dept: INTERNAL MEDICINE CLINIC | Facility: CLINIC | Age: 67
End: 2022-11-14

## 2022-11-14 RX ORDER — VALSARTAN 160 MG/1
160 TABLET ORAL DAILY
Qty: 30 TABLET | Refills: 0 | Status: SHIPPED | OUTPATIENT
Start: 2022-11-14

## 2022-11-14 NOTE — TELEPHONE ENCOUNTER
Incoming (mail or fax):  fax  Received from:  [facility]  Documentation given to:  Karuna    Diabetic eye exam

## 2022-11-22 ENCOUNTER — MED REC SCAN ONLY (OUTPATIENT)
Dept: INTERNAL MEDICINE CLINIC | Facility: CLINIC | Age: 67
End: 2022-11-22

## 2022-12-01 RX ORDER — VALSARTAN 160 MG/1
TABLET ORAL
Qty: 90 TABLET | Refills: 0 | Status: SHIPPED | OUTPATIENT
Start: 2022-12-01

## 2022-12-12 ENCOUNTER — LAB ENCOUNTER (OUTPATIENT)
Dept: LAB | Age: 67
End: 2022-12-12
Attending: INTERNAL MEDICINE
Payer: MEDICARE

## 2022-12-12 DIAGNOSIS — E78.2 MIXED HYPERLIPIDEMIA: ICD-10-CM

## 2022-12-12 DIAGNOSIS — Z11.59 NEED FOR HEPATITIS C SCREENING TEST: ICD-10-CM

## 2022-12-12 DIAGNOSIS — E11.9 TYPE 2 DIABETES MELLITUS WITHOUT COMPLICATION, WITHOUT LONG-TERM CURRENT USE OF INSULIN (HCC): ICD-10-CM

## 2022-12-12 LAB
ALBUMIN SERPL-MCNC: 3.8 G/DL (ref 3.4–5)
ALBUMIN/GLOB SERPL: 1.2 {RATIO} (ref 1–2)
ALP LIVER SERPL-CCNC: 70 U/L
ALT SERPL-CCNC: 34 U/L
ANION GAP SERPL CALC-SCNC: 2 MMOL/L (ref 0–18)
AST SERPL-CCNC: 19 U/L (ref 15–37)
BILIRUB SERPL-MCNC: 0.5 MG/DL (ref 0.1–2)
BUN BLD-MCNC: 13 MG/DL (ref 7–18)
CALCIUM BLD-MCNC: 9.2 MG/DL (ref 8.5–10.1)
CHLORIDE SERPL-SCNC: 111 MMOL/L (ref 98–112)
CHOLEST SERPL-MCNC: 218 MG/DL (ref ?–200)
CO2 SERPL-SCNC: 29 MMOL/L (ref 21–32)
CREAT BLD-MCNC: 0.8 MG/DL
CREAT UR-SCNC: 62.4 MG/DL
EST. AVERAGE GLUCOSE BLD GHB EST-MCNC: 137 MG/DL (ref 68–126)
FASTING PATIENT LIPID ANSWER: YES
FASTING STATUS PATIENT QL REPORTED: YES
GFR SERPLBLD BASED ON 1.73 SQ M-ARVRAT: 81 ML/MIN/1.73M2 (ref 60–?)
GLOBULIN PLAS-MCNC: 3.2 G/DL (ref 2.8–4.4)
GLUCOSE BLD-MCNC: 119 MG/DL (ref 70–99)
HBA1C MFR BLD: 6.4 % (ref ?–5.7)
HCV AB SERPL QL IA: NONREACTIVE
HDLC SERPL-MCNC: 53 MG/DL (ref 40–59)
LDLC SERPL CALC-MCNC: 118 MG/DL (ref ?–100)
MICROALBUMIN UR-MCNC: <0.5 MG/DL
NONHDLC SERPL-MCNC: 165 MG/DL (ref ?–130)
OSMOLALITY SERPL CALC.SUM OF ELEC: 295 MOSM/KG (ref 275–295)
POTASSIUM SERPL-SCNC: 4.4 MMOL/L (ref 3.5–5.1)
PROT SERPL-MCNC: 7 G/DL (ref 6.4–8.2)
SODIUM SERPL-SCNC: 142 MMOL/L (ref 136–145)
TRIGL SERPL-MCNC: 271 MG/DL (ref 30–149)
VLDLC SERPL CALC-MCNC: 48 MG/DL (ref 0–30)

## 2022-12-12 PROCEDURE — 80053 COMPREHEN METABOLIC PANEL: CPT

## 2022-12-12 PROCEDURE — 82570 ASSAY OF URINE CREATININE: CPT

## 2022-12-12 PROCEDURE — 83036 HEMOGLOBIN GLYCOSYLATED A1C: CPT

## 2022-12-12 PROCEDURE — 82043 UR ALBUMIN QUANTITATIVE: CPT

## 2022-12-12 PROCEDURE — 36415 COLL VENOUS BLD VENIPUNCTURE: CPT

## 2022-12-12 PROCEDURE — 86803 HEPATITIS C AB TEST: CPT

## 2022-12-12 PROCEDURE — 80061 LIPID PANEL: CPT

## 2022-12-16 ENCOUNTER — HOSPITAL ENCOUNTER (OUTPATIENT)
Dept: MAMMOGRAPHY | Facility: HOSPITAL | Age: 67
Discharge: HOME | End: 2022-12-16
Attending: INTERNAL MEDICINE
Payer: MEDICARE

## 2022-12-16 DIAGNOSIS — Z12.31 ENCOUNTER FOR SCREENING MAMMOGRAM FOR BREAST CANCER: ICD-10-CM

## 2022-12-16 DIAGNOSIS — R92.8 ABNORMAL MAMMOGRAM: ICD-10-CM

## 2022-12-16 PROCEDURE — 77062 BREAST TOMOSYNTHESIS BI: CPT | Performed by: INTERNAL MEDICINE

## 2022-12-16 PROCEDURE — 76642 ULTRASOUND BREAST LIMITED: CPT | Performed by: INTERNAL MEDICINE

## 2022-12-16 PROCEDURE — 77066 DX MAMMO INCL CAD BI: CPT | Performed by: INTERNAL MEDICINE

## 2022-12-19 ENCOUNTER — HOSPITAL ENCOUNTER (OUTPATIENT)
Dept: ULTRASOUND IMAGING | Age: 67
Discharge: HOME | End: 2022-12-19
Attending: INTERNAL MEDICINE
Payer: MEDICARE

## 2022-12-19 DIAGNOSIS — N28.1 RENAL CYST, LEFT: ICD-10-CM

## 2022-12-19 PROCEDURE — 76775 US EXAM ABDO BACK WALL LIM: CPT | Performed by: INTERNAL MEDICINE

## 2022-12-21 ENCOUNTER — OFFICE VISIT (OUTPATIENT)
Dept: INTERNAL MEDICINE CLINIC | Facility: CLINIC | Age: 67
End: 2022-12-21
Payer: MEDICARE

## 2022-12-21 VITALS
DIASTOLIC BLOOD PRESSURE: 80 MMHG | BODY MASS INDEX: 28.66 KG/M2 | RESPIRATION RATE: 20 BRPM | SYSTOLIC BLOOD PRESSURE: 124 MMHG | WEIGHT: 146 LBS | OXYGEN SATURATION: 99 % | TEMPERATURE: 98 F | HEART RATE: 98 BPM | HEIGHT: 60 IN

## 2022-12-21 DIAGNOSIS — E78.2 MIXED HYPERLIPIDEMIA: ICD-10-CM

## 2022-12-21 DIAGNOSIS — E11.9 TYPE 2 DIABETES MELLITUS WITHOUT COMPLICATION, WITHOUT LONG-TERM CURRENT USE OF INSULIN (HCC): Primary | ICD-10-CM

## 2022-12-21 DIAGNOSIS — I10 PRIMARY HYPERTENSION: ICD-10-CM

## 2022-12-21 PROCEDURE — 99214 OFFICE O/P EST MOD 30 MIN: CPT | Performed by: INTERNAL MEDICINE

## 2022-12-21 RX ORDER — VALSARTAN 80 MG/1
80 TABLET ORAL DAILY
Qty: 90 TABLET | Refills: 0 | Status: SHIPPED | OUTPATIENT
Start: 2022-12-21

## 2022-12-21 RX ORDER — ROSUVASTATIN CALCIUM 10 MG/1
10 TABLET, COATED ORAL NIGHTLY
Qty: 90 TABLET | Refills: 1 | Status: SHIPPED | OUTPATIENT
Start: 2022-12-21

## 2022-12-21 RX ORDER — ROSUVASTATIN CALCIUM 10 MG/1
TABLET, COATED ORAL
COMMUNITY
Start: 2022-11-30 | End: 2022-12-21 | Stop reason: ALTCHOICE

## 2023-04-26 RX ORDER — LEVOCETIRIZINE DIHYDROCHLORIDE 5 MG/1
5 TABLET, FILM COATED ORAL EVERY EVENING
Qty: 90 TABLET | Refills: 0 | Status: SHIPPED | OUTPATIENT
Start: 2023-04-26

## 2023-04-26 NOTE — TELEPHONE ENCOUNTER
Allergy Medication Protocol Passed 04/26/2023 12:18 PM    Appointment in the past 12 or next 3 months     Future Appointments   Date Time Provider Wes Humphrey   6/12/2023 10:20 AM Concepcion Vegas MD [facility] [place]     Refilled per protocol.

## 2023-06-13 RX ORDER — LEVOCETIRIZINE DIHYDROCHLORIDE 5 MG/1
TABLET, FILM COATED ORAL
Qty: 90 TABLET | Refills: 0 | Status: SHIPPED | OUTPATIENT
Start: 2023-06-13

## 2023-06-13 NOTE — TELEPHONE ENCOUNTER
Allergy Medication Protocol Passed 06/13/2023 01:38 PM    Appointment in the past 12 or next 3 months        Future Appointments   Date Time Provider Wes Humphrey   9/6/2023 10:40 AM Jose Angel Brooks MD [facility] [place]   Refilled per protocol.

## 2023-07-13 ENCOUNTER — HOSPITAL ENCOUNTER (OUTPATIENT)
Dept: NUCLEAR MEDICINE | Facility: HOSPITAL | Age: 68
Discharge: HOME | End: 2023-07-13
Attending: FAMILY MEDICINE
Payer: MEDICARE

## 2023-07-13 DIAGNOSIS — R10.11 RIGHT UPPER QUADRANT PAIN: ICD-10-CM

## 2023-07-13 DIAGNOSIS — R19.8 POSITIVE MURPHY'S SIGN: ICD-10-CM

## 2023-07-13 PROCEDURE — 78227 HEPATOBIL SYST IMAGE W/DRUG: CPT | Performed by: FAMILY MEDICINE

## 2023-09-05 ENCOUNTER — LAB ENCOUNTER (OUTPATIENT)
Dept: LAB | Age: 68
End: 2023-09-05
Attending: INTERNAL MEDICINE
Payer: MEDICARE

## 2023-09-05 DIAGNOSIS — E78.2 MIXED HYPERLIPIDEMIA: ICD-10-CM

## 2023-09-05 DIAGNOSIS — E11.9 TYPE 2 DIABETES MELLITUS WITHOUT COMPLICATION, WITHOUT LONG-TERM CURRENT USE OF INSULIN (HCC): ICD-10-CM

## 2023-09-05 LAB
ALBUMIN SERPL-MCNC: 3.8 G/DL (ref 3.4–5)
ALBUMIN/GLOB SERPL: 1.2 {RATIO} (ref 1–2)
ALP LIVER SERPL-CCNC: 70 U/L
ALT SERPL-CCNC: 44 U/L
ANION GAP SERPL CALC-SCNC: 5 MMOL/L (ref 0–18)
AST SERPL-CCNC: 19 U/L (ref 15–37)
BILIRUB SERPL-MCNC: 0.4 MG/DL (ref 0.1–2)
BUN BLD-MCNC: 15 MG/DL (ref 7–18)
CALCIUM BLD-MCNC: 9.3 MG/DL (ref 8.5–10.1)
CHLORIDE SERPL-SCNC: 108 MMOL/L (ref 98–112)
CHOLEST SERPL-MCNC: 208 MG/DL (ref ?–200)
CO2 SERPL-SCNC: 28 MMOL/L (ref 21–32)
CREAT BLD-MCNC: 0.95 MG/DL
CREAT UR-SCNC: 218 MG/DL
EGFRCR SERPLBLD CKD-EPI 2021: 66 ML/MIN/1.73M2 (ref 60–?)
EST. AVERAGE GLUCOSE BLD GHB EST-MCNC: 146 MG/DL (ref 68–126)
FASTING PATIENT LIPID ANSWER: YES
FASTING STATUS PATIENT QL REPORTED: YES
GLOBULIN PLAS-MCNC: 3.2 G/DL (ref 2.8–4.4)
GLUCOSE BLD-MCNC: 124 MG/DL (ref 70–99)
HBA1C MFR BLD: 6.7 % (ref ?–5.7)
HDLC SERPL-MCNC: 61 MG/DL (ref 40–59)
LDLC SERPL CALC-MCNC: 104 MG/DL (ref ?–100)
MICROALBUMIN UR-MCNC: 2.03 MG/DL
MICROALBUMIN/CREAT 24H UR-RTO: 9.3 UG/MG (ref ?–30)
NONHDLC SERPL-MCNC: 147 MG/DL (ref ?–130)
OSMOLALITY SERPL CALC.SUM OF ELEC: 294 MOSM/KG (ref 275–295)
POTASSIUM SERPL-SCNC: 4.2 MMOL/L (ref 3.5–5.1)
PROT SERPL-MCNC: 7 G/DL (ref 6.4–8.2)
SODIUM SERPL-SCNC: 141 MMOL/L (ref 136–145)
TRIGL SERPL-MCNC: 253 MG/DL (ref 30–149)
VLDLC SERPL CALC-MCNC: 43 MG/DL (ref 0–30)

## 2023-09-05 PROCEDURE — 36415 COLL VENOUS BLD VENIPUNCTURE: CPT

## 2023-09-05 PROCEDURE — 82043 UR ALBUMIN QUANTITATIVE: CPT

## 2023-09-05 PROCEDURE — 80053 COMPREHEN METABOLIC PANEL: CPT

## 2023-09-05 PROCEDURE — 82570 ASSAY OF URINE CREATININE: CPT

## 2023-09-05 PROCEDURE — 80061 LIPID PANEL: CPT

## 2023-09-05 PROCEDURE — 83036 HEMOGLOBIN GLYCOSYLATED A1C: CPT

## 2023-09-06 ENCOUNTER — OFFICE VISIT (OUTPATIENT)
Dept: INTERNAL MEDICINE CLINIC | Facility: CLINIC | Age: 68
End: 2023-09-06
Payer: MEDICARE

## 2023-09-06 VITALS
RESPIRATION RATE: 20 BRPM | OXYGEN SATURATION: 98 % | BODY MASS INDEX: 23.58 KG/M2 | DIASTOLIC BLOOD PRESSURE: 80 MMHG | HEIGHT: 66 IN | HEART RATE: 94 BPM | SYSTOLIC BLOOD PRESSURE: 146 MMHG | WEIGHT: 146.69 LBS | TEMPERATURE: 98 F

## 2023-09-06 DIAGNOSIS — Z12.31 ENCOUNTER FOR SCREENING MAMMOGRAM FOR MALIGNANT NEOPLASM OF BREAST: ICD-10-CM

## 2023-09-06 DIAGNOSIS — J30.2 SEASONAL ALLERGIC RHINITIS, UNSPECIFIED TRIGGER: ICD-10-CM

## 2023-09-06 DIAGNOSIS — I10 PRIMARY HYPERTENSION: ICD-10-CM

## 2023-09-06 DIAGNOSIS — F51.01 PRIMARY INSOMNIA: ICD-10-CM

## 2023-09-06 DIAGNOSIS — E11.9 TYPE 2 DIABETES MELLITUS WITHOUT COMPLICATION, WITHOUT LONG-TERM CURRENT USE OF INSULIN (HCC): ICD-10-CM

## 2023-09-06 DIAGNOSIS — Z00.00 ENCOUNTER FOR ANNUAL HEALTH EXAMINATION: Primary | ICD-10-CM

## 2023-09-06 DIAGNOSIS — Z78.0 POSTMENOPAUSAL: ICD-10-CM

## 2023-09-06 DIAGNOSIS — E78.2 MIXED HYPERLIPIDEMIA: ICD-10-CM

## 2023-09-06 DIAGNOSIS — R07.81 RIB PAIN ON RIGHT SIDE: ICD-10-CM

## 2023-09-06 PROCEDURE — 99214 OFFICE O/P EST MOD 30 MIN: CPT | Performed by: INTERNAL MEDICINE

## 2023-09-06 PROCEDURE — G0438 PPPS, INITIAL VISIT: HCPCS | Performed by: INTERNAL MEDICINE

## 2023-09-06 PROCEDURE — 1125F AMNT PAIN NOTED PAIN PRSNT: CPT | Performed by: INTERNAL MEDICINE

## 2023-09-06 RX ORDER — LEVOCETIRIZINE DIHYDROCHLORIDE 5 MG/1
5 TABLET, FILM COATED ORAL EVERY EVENING
Qty: 90 TABLET | Refills: 1 | Status: SHIPPED | OUTPATIENT
Start: 2023-09-06

## 2023-09-06 RX ORDER — ROSUVASTATIN CALCIUM 10 MG/1
10 TABLET, COATED ORAL NIGHTLY
Qty: 90 TABLET | Refills: 1 | Status: SHIPPED | OUTPATIENT
Start: 2023-09-06

## 2023-09-06 RX ORDER — VALSARTAN 80 MG/1
80 TABLET ORAL DAILY
Qty: 90 TABLET | Refills: 1 | Status: SHIPPED | OUTPATIENT
Start: 2023-09-06

## 2023-09-06 RX ORDER — ZOLPIDEM TARTRATE 5 MG/1
5 TABLET ORAL NIGHTLY PRN
Qty: 30 TABLET | Refills: 0 | Status: SHIPPED | OUTPATIENT
Start: 2023-09-06

## 2023-09-06 RX ORDER — OMEPRAZOLE 40 MG/1
40 CAPSULE, DELAYED RELEASE ORAL DAILY
COMMUNITY
Start: 2023-06-26

## 2023-11-07 ENCOUNTER — TELEPHONE (OUTPATIENT)
Dept: INTERNAL MEDICINE CLINIC | Facility: CLINIC | Age: 68
End: 2023-11-07

## 2023-11-07 DIAGNOSIS — I77.810 ASCENDING AORTA DILATION (HCC): Primary | ICD-10-CM

## 2023-11-07 NOTE — TELEPHONE ENCOUNTER
Pt's echo cardiogram expires tomorrow, and she needs a new one ordered.      CARD ECHO 2D DOPPLER (CPT=93306) ([id number]) [[id number]]

## 2023-11-07 NOTE — TELEPHONE ENCOUNTER
Incoming (mail or fax):  fax  Received from:  [facility] central scheduling  Documentation given to:  triage incoming

## 2023-11-21 ENCOUNTER — TELEPHONE (OUTPATIENT)
Dept: INTERNAL MEDICINE CLINIC | Facility: CLINIC | Age: 68
End: 2023-11-21

## 2023-12-14 ENCOUNTER — HOSPITAL ENCOUNTER (OUTPATIENT)
Dept: CV DIAGNOSTICS | Facility: HOSPITAL | Age: 68
Discharge: HOME | End: 2023-12-14
Attending: INTERNAL MEDICINE
Payer: MEDICARE

## 2023-12-14 DIAGNOSIS — I77.810 ASCENDING AORTA DILATION (HCC): ICD-10-CM

## 2023-12-14 PROCEDURE — 93306 TTE W/DOPPLER COMPLETE: CPT | Performed by: INTERNAL MEDICINE

## 2023-12-19 ENCOUNTER — HOSPITAL ENCOUNTER (OUTPATIENT)
Dept: MAMMOGRAPHY | Facility: HOSPITAL | Age: 68
Discharge: HOME | End: 2023-12-19
Attending: INTERNAL MEDICINE
Payer: MEDICARE

## 2023-12-19 DIAGNOSIS — Z12.31 ENCOUNTER FOR SCREENING MAMMOGRAM FOR MALIGNANT NEOPLASM OF BREAST: ICD-10-CM

## 2023-12-19 PROCEDURE — 77063 BREAST TOMOSYNTHESIS BI: CPT | Performed by: INTERNAL MEDICINE

## 2023-12-19 PROCEDURE — 77067 SCR MAMMO BI INCL CAD: CPT | Performed by: INTERNAL MEDICINE

## 2024-02-24 DIAGNOSIS — I10 PRIMARY HYPERTENSION: ICD-10-CM

## 2024-02-26 RX ORDER — VALSARTAN 80 MG/1
80 TABLET ORAL DAILY
Qty: 90 TABLET | Refills: 0 | Status: SHIPPED | OUTPATIENT
Start: 2024-02-26

## 2024-02-26 NOTE — TELEPHONE ENCOUNTER
Last OV relevant to medication: 9/6/23  Last refill date: 9/6/23 #90/refills: 1  When pt was asked to return for OV: 3/6/24  Upcoming appt/reason:   Future Appointments   Date Time Provider Department Center   2/28/2024 11:00 AM WDR DEXA [facility] DEXA [facility] [place]   3/6/2024 11:40 AM Briana Reaves MD [facility] [place]   Was pt informed of any over due labs: due prior to apt  Lab Results   Component Value Date     (H) 09/05/2023    BUN 15 09/05/2023    BUNCREA 20.0 12/16/2020    CREATSERUM 0.95 09/05/2023    ANIONGAP 5 09/05/2023    GFR 87 02/13/2017    GFRNAA 73 06/02/2022    GFRAA 84 06/02/2022    CA 9.3 09/05/2023    OSMOCALC 294 09/05/2023    ALKPHO 70 09/05/2023    AST 19 09/05/2023    ALT 44 09/05/2023    BILT 0.4 09/05/2023    TP 7.0 09/05/2023    ALB 3.8 09/05/2023    GLOBULIN 3.2 09/05/2023     09/05/2023    K 4.2 09/05/2023     09/05/2023    CO2 28.0 09/05/2023

## 2024-02-28 ENCOUNTER — HOSPITAL ENCOUNTER (OUTPATIENT)
Dept: BONE DENSITY | Age: 69
Discharge: HOME | End: 2024-02-28
Attending: INTERNAL MEDICINE
Payer: MEDICARE

## 2024-02-28 DIAGNOSIS — Z78.0 POSTMENOPAUSAL: ICD-10-CM

## 2024-02-28 DIAGNOSIS — M81.0 OSTEOPOROSIS, UNSPECIFIED OSTEOPOROSIS TYPE, UNSPECIFIED PATHOLOGICAL FRACTURE PRESENCE: Primary | ICD-10-CM

## 2024-02-28 PROCEDURE — 77080 DXA BONE DENSITY AXIAL: CPT | Performed by: INTERNAL MEDICINE

## 2024-03-04 ENCOUNTER — LAB ENCOUNTER (OUTPATIENT)
Dept: LAB | Age: 69
End: 2024-03-04
Attending: INTERNAL MEDICINE
Payer: MEDICARE

## 2024-03-04 DIAGNOSIS — E11.9 TYPE 2 DIABETES MELLITUS WITHOUT COMPLICATION, WITHOUT LONG-TERM CURRENT USE OF INSULIN (HCC): ICD-10-CM

## 2024-03-04 DIAGNOSIS — I10 PRIMARY HYPERTENSION: ICD-10-CM

## 2024-03-04 DIAGNOSIS — E78.2 MIXED HYPERLIPIDEMIA: ICD-10-CM

## 2024-03-04 LAB
ALBUMIN SERPL-MCNC: 3.9 G/DL (ref 3.4–5)
ALBUMIN/GLOB SERPL: 1.1 {RATIO} (ref 1–2)
ALP LIVER SERPL-CCNC: 63 U/L
ALT SERPL-CCNC: 38 U/L
ANION GAP SERPL CALC-SCNC: 2 MMOL/L (ref 0–18)
AST SERPL-CCNC: 19 U/L (ref 15–37)
BILIRUB SERPL-MCNC: 0.5 MG/DL (ref 0.1–2)
BUN BLD-MCNC: 16 MG/DL (ref 9–23)
CALCIUM BLD-MCNC: 9.5 MG/DL (ref 8.5–10.1)
CHLORIDE SERPL-SCNC: 108 MMOL/L (ref 98–112)
CHOLEST SERPL-MCNC: 217 MG/DL (ref ?–200)
CO2 SERPL-SCNC: 29 MMOL/L (ref 21–32)
CREAT BLD-MCNC: 0.72 MG/DL
CREAT UR-SCNC: 135 MG/DL
EGFRCR SERPLBLD CKD-EPI 2021: 92 ML/MIN/1.73M2 (ref 60–?)
EST. AVERAGE GLUCOSE BLD GHB EST-MCNC: 148 MG/DL (ref 68–126)
FASTING PATIENT LIPID ANSWER: YES
FASTING STATUS PATIENT QL REPORTED: YES
GLOBULIN PLAS-MCNC: 3.4 G/DL (ref 2.8–4.4)
GLUCOSE BLD-MCNC: 123 MG/DL (ref 70–99)
HBA1C MFR BLD: 6.8 % (ref ?–5.7)
HDLC SERPL-MCNC: 62 MG/DL (ref 40–59)
LDLC SERPL CALC-MCNC: 118 MG/DL (ref ?–100)
MICROALBUMIN UR-MCNC: 0.89 MG/DL
MICROALBUMIN/CREAT 24H UR-RTO: 6.6 UG/MG (ref ?–30)
NONHDLC SERPL-MCNC: 155 MG/DL (ref ?–130)
OSMOLALITY SERPL CALC.SUM OF ELEC: 291 MOSM/KG (ref 275–295)
POTASSIUM SERPL-SCNC: 4.1 MMOL/L (ref 3.5–5.1)
PROT SERPL-MCNC: 7.3 G/DL (ref 6.4–8.2)
SODIUM SERPL-SCNC: 139 MMOL/L (ref 136–145)
TRIGL SERPL-MCNC: 212 MG/DL (ref 30–149)
VLDLC SERPL CALC-MCNC: 37 MG/DL (ref 0–30)

## 2024-03-04 PROCEDURE — 80061 LIPID PANEL: CPT

## 2024-03-04 PROCEDURE — 82570 ASSAY OF URINE CREATININE: CPT

## 2024-03-04 PROCEDURE — 80053 COMPREHEN METABOLIC PANEL: CPT

## 2024-03-04 PROCEDURE — 83036 HEMOGLOBIN GLYCOSYLATED A1C: CPT

## 2024-03-04 PROCEDURE — 36415 COLL VENOUS BLD VENIPUNCTURE: CPT

## 2024-03-04 PROCEDURE — 84443 ASSAY THYROID STIM HORMONE: CPT

## 2024-03-04 PROCEDURE — 82043 UR ALBUMIN QUANTITATIVE: CPT

## 2024-03-04 PROCEDURE — 83735 ASSAY OF MAGNESIUM: CPT

## 2024-03-06 ENCOUNTER — LAB ENCOUNTER (OUTPATIENT)
Dept: LAB | Age: 69
End: 2024-03-06
Attending: INTERNAL MEDICINE
Payer: MEDICARE

## 2024-03-06 ENCOUNTER — OFFICE VISIT (OUTPATIENT)
Dept: INTERNAL MEDICINE CLINIC | Facility: CLINIC | Age: 69
End: 2024-03-06
Payer: MEDICARE

## 2024-03-06 VITALS
BODY MASS INDEX: 23.46 KG/M2 | WEIGHT: 146 LBS | RESPIRATION RATE: 20 BRPM | HEART RATE: 102 BPM | HEIGHT: 66 IN | DIASTOLIC BLOOD PRESSURE: 90 MMHG | TEMPERATURE: 98 F | SYSTOLIC BLOOD PRESSURE: 138 MMHG | OXYGEN SATURATION: 98 %

## 2024-03-06 DIAGNOSIS — J30.2 SEASONAL ALLERGIC RHINITIS, UNSPECIFIED TRIGGER: ICD-10-CM

## 2024-03-06 DIAGNOSIS — R39.9 UTI SYMPTOMS: ICD-10-CM

## 2024-03-06 DIAGNOSIS — I10 PRIMARY HYPERTENSION: ICD-10-CM

## 2024-03-06 DIAGNOSIS — F51.01 PRIMARY INSOMNIA: ICD-10-CM

## 2024-03-06 DIAGNOSIS — M81.0 AGE-RELATED OSTEOPOROSIS WITHOUT CURRENT PATHOLOGICAL FRACTURE: ICD-10-CM

## 2024-03-06 DIAGNOSIS — K21.9 GASTROESOPHAGEAL REFLUX DISEASE WITHOUT ESOPHAGITIS: ICD-10-CM

## 2024-03-06 DIAGNOSIS — E78.2 MIXED HYPERLIPIDEMIA: ICD-10-CM

## 2024-03-06 DIAGNOSIS — E11.9 TYPE 2 DIABETES MELLITUS WITHOUT COMPLICATION, WITHOUT LONG-TERM CURRENT USE OF INSULIN (HCC): ICD-10-CM

## 2024-03-06 DIAGNOSIS — M54.9 UPPER BACK PAIN ON LEFT SIDE: ICD-10-CM

## 2024-03-06 DIAGNOSIS — I77.810 ASCENDING AORTA DILATION (HCC): ICD-10-CM

## 2024-03-06 LAB
APPEARANCE: YELLOW
GLUCOSE (URINE DIPSTICK): NEGATIVE MG/DL
KETONES (URINE DIPSTICK): NEGATIVE MG/DL
LEUKOCYTES: NEGATIVE
MAGNESIUM SERPL-MCNC: 2.2 MG/DL (ref 1.6–2.6)
MULTISTIX LOT#: ABNORMAL NUMERIC
NITRITE, URINE: NEGATIVE
PH, URINE: 5.5 (ref 4.5–8)
SPECIFIC GRAVITY: 1.02 (ref 1–1.03)
TSI SER-ACNC: 1.48 MIU/ML (ref 0.36–3.74)
UROBILINOGEN,SEMI-QN: 0.2 MG/DL (ref 0–1.9)
VIT D+METAB SERPL-MCNC: 29.3 NG/ML (ref 30–100)

## 2024-03-06 PROCEDURE — 81003 URINALYSIS AUTO W/O SCOPE: CPT | Performed by: INTERNAL MEDICINE

## 2024-03-06 PROCEDURE — 36415 COLL VENOUS BLD VENIPUNCTURE: CPT

## 2024-03-06 PROCEDURE — 87186 SC STD MICRODIL/AGAR DIL: CPT | Performed by: INTERNAL MEDICINE

## 2024-03-06 PROCEDURE — 99215 OFFICE O/P EST HI 40 MIN: CPT | Performed by: INTERNAL MEDICINE

## 2024-03-06 PROCEDURE — 87086 URINE CULTURE/COLONY COUNT: CPT | Performed by: INTERNAL MEDICINE

## 2024-03-06 PROCEDURE — 82306 VITAMIN D 25 HYDROXY: CPT

## 2024-03-06 PROCEDURE — 87077 CULTURE AEROBIC IDENTIFY: CPT | Performed by: INTERNAL MEDICINE

## 2024-03-06 RX ORDER — CELECOXIB 200 MG/1
200 CAPSULE ORAL
COMMUNITY
Start: 2023-11-30

## 2024-03-06 RX ORDER — LEVOCETIRIZINE DIHYDROCHLORIDE 5 MG/1
5 TABLET, FILM COATED ORAL EVERY EVENING
Qty: 90 TABLET | Refills: 1 | Status: SHIPPED | OUTPATIENT
Start: 2024-03-06

## 2024-03-06 RX ORDER — NITROFURANTOIN 25; 75 MG/1; MG/1
100 CAPSULE ORAL 2 TIMES DAILY
Qty: 14 CAPSULE | Refills: 0 | Status: SHIPPED | OUTPATIENT
Start: 2024-03-06

## 2024-03-06 RX ORDER — VALSARTAN 80 MG/1
80 TABLET ORAL DAILY
Qty: 90 TABLET | Refills: 0 | Status: SHIPPED | OUTPATIENT
Start: 2024-03-06

## 2024-03-06 RX ORDER — ROSUVASTATIN CALCIUM 10 MG/1
10 TABLET, COATED ORAL NIGHTLY
Qty: 90 TABLET | Refills: 1 | Status: SHIPPED | OUTPATIENT
Start: 2024-03-06

## 2024-03-06 NOTE — PROGRESS NOTES
[facility]    CHIEF COMPLAINT:    Chief Complaint   Patient presents with    Medication Follow-Up     6 month med c/k Mammo 12/19/23  Colon 2/27/20 5 yrs    Diabetes     Foot exam Due 9/6/24  Eye exam 11/21/24    UTI     Possible UTI Symptoms started yesterday morning burning, pressure when urinating and urine looks cloudy          HISTORY OF PRESENT ILLNESS:  here for med check but also has other concerns.   Htn: Taking meds regularly. No chest pain, no shortness of breath. Now on valsartan 80mg daily. Readings at home are great. Always a bit high here. reading at home today 102/76.     Hyperlipidemia: on statin. No muscle aches.  On crestor. LDL not quite at goal. She cannot tolerate a higher dose of statin.      Dm: off metformin. Diet controlled. A1c at goal. Eye exam due 11/2024.      Gerd: on pantoprazole. Manifests as hoarseness. Managed by her ent.      Ascending aortic dilatation: due echo. Order in epic. She is aware.      Has a history of simple renal cysts.      Insomnia: on ambien. Takes very sparingly. Works well when she takes it.      She has pain in ruq since January. Pain comes and goes, sharp when she has it, usually lasts about 5 mins or so, she has to rub it and it goes away. Usually wakes her up at night. Not too much during the day. She had a work up done by her sports medicine doctor who is also an internist. Labs were unrevealing. She had a ruq us which is not available to me today but pt states was negative for gallstones. She also had a hepatobiliary scan which showed normal gallbladder function.   She now has an egd scheduled with her gi.     Complains of pain in the left upper back for a month or so. Trying conservative measures and not helping. Posture is bad. Taking celebrex daily for 2 weeks.     Wonders about her magnesium level.     Dexa showed osteopososis. She does not want to take meds for this. Not a candidate for bisphosphonates due to gerd. Does not want to see  endocrine yet. She will work on calcium and vit d intake and weight bearing exercise. Aware of risk of fracture.     Has had uti symptoms for a day. Has urinary pressure, dysuria, urinary frequency. No blood in urine.     REVIEW OF SYSTEMS:  See HPI    Current Medications:    Current Outpatient Medications   Medication Sig Dispense Refill    celecoxib 200 MG Oral Cap Take 1 capsule (200 mg total) by mouth daily with food.      rosuvastatin 10 MG Oral Tab Take 1 tablet (10 mg total) by mouth nightly. 90 tablet 1    levocetirizine 5 MG Oral Tab Take 1 tablet (5 mg total) by mouth every evening. 90 tablet 1    valsartan 80 MG Oral Tab Take 1 tablet (80 mg total) by mouth daily. 90 tablet 0    nitrofurantoin monohydrate macro 100 MG Oral Cap Take 1 capsule (100 mg total) by mouth 2 (two) times daily. 14 capsule 0    Omeprazole 40 MG Oral Capsule Delayed Release Take 1 capsule (40 mg total) by mouth daily.      zolpidem 5 MG Oral Tab Take 1 tablet (5 mg total) by mouth nightly as needed for Sleep. 30 tablet 0    Ciclopirox Olamine 0.77 % External Cream as needed.      cholecalciferol 50 MCG (2000 UT) Oral Cap Take 1 capsule (2,000 Units total) by mouth daily.      multivitamin Oral Tab Take 1 tablet by mouth daily.      Calcium Carb-Cholecalciferol (CALCIUM + D3) 600-200 MG-UNIT Oral Tab Take 1 tablet by mouth daily.      Fluticasone Furoate 27.5 MCG/SPRAY Nasal Suspension 2 sprays by Nasal route as needed.      Aspirin 81 MG Oral Tab Take 1 tablet (81 mg total) by mouth daily.         PAST MEDICAL, SOCIAL, AND FAMILY HISTORY:  Tobacco:    History   Smoking Status    Never   Smokeless Tobacco    Never       PHYSICAL EXAM:  /90 (BP Location: Left arm, Patient Position: Sitting, Cuff Size: adult)   Pulse 102   Temp 97.8 °F (36.6 °C)   Resp 20   Ht 5' 6\" (1.676 m)   Wt 146 lb (66.2 kg)   SpO2 98%   BMI 23.57 kg/m²    GENERAL: well developed, well nourished,in no apparent distress  LUNGS: clear to  auscultation  CARDIO: RRR without murmur  GI: good BS's,no masses, HSM or tenderness  MUSCULOSKELETAL: tenderness to palpation of trapezius muscle in the left upper back. Good rom of the shoulder.   EXTREMITIES:  no edema, muscle strength normal.     DATA:  Results for orders placed or performed in visit on 03/06/24   Urine Dip, auto without Micro   Result Value Ref Range    Glucose Urine Negative Negative mg/dL    Bilirubin Urine Small (A) Negative    Ketones, UA Negative Negative - Trace mg/dL    Spec Gravity 1.025 1.005 - 1.030    Blood Urine Trace-lysed (A) Negative    PH Urine 5.5 5.0 - 8.0    Protein Urine Trace Negative - Trace mg/dL    Urobilinogen Urine 0.2 0.2 - 1.0 mg/dL    Nitrite Urine Negative Negative    Leukocyte Esterase Urine Negative Negative    APPEARANCE yellow Clear    Color Urine      Multistix Lot# [id number] Numeric    Multistix Expiration Date 08- Date            ASSESSMENT AND PLAN:  1. Primary hypertension  Continue meds.   - valsartan 80 MG Oral Tab; Take 1 tablet (80 mg total) by mouth daily.  Dispense: 90 tablet; Refill: 0  - TSH W Reflex To Free T4 [E]; Future  - Magnesium [E]; Future    2. Mixed hyperlipidemia  Continue statin.   - rosuvastatin 10 MG Oral Tab; Take 1 tablet (10 mg total) by mouth nightly.  Dispense: 90 tablet; Refill: 1  - CMP in 6 months; Future  - Lipid in 6 months; Future    3. Type 2 diabetes mellitus without complication, without long-term current use of insulin (HCC)  Continue diet control.   Up to date eye exam.   - Hemoglobin A1C in 6 months; Future    4. Gastroesophageal reflux disease without esophagitis  Continue ppi.   follow up with gi.     5. Ascending aorta dilation (HCC)  Echo stable.     6. Seasonal allergic rhinitis, unspecified trigger  - levocetirizine 5 MG Oral Tab; Take 1 tablet (5 mg total) by mouth every evening.  Dispense: 90 tablet; Refill: 1    7. UTI symptoms  Urine dip unremarkable.   Will send for culture.   Treat based on symptoms.  Given macrobid.   - Urine Dip, auto without Micro  - nitrofurantoin monohydrate macro 100 MG Oral Cap; Take 1 capsule (100 mg total) by mouth 2 (two) times daily.  Dispense: 14 capsule; Refill: 0  - Urine Culture, Routine [E]; Future  - Urine Culture, Routine [E]    8. Upper back pain on left side  Trial PT.   - Physical Therapy Referral - [facility]    9. Primary insomnia  Continue meds.     10. Age-related osteoporosis without current pathological fracture  - VITAMIN D, 25-HYDROXY [46265][Q]; Future        Return in about 3 months (around 6/6/2024) for breast and pelvic.    45 minutes spent on provision of medical services today, including chart review, obtaining and reviewing history, performing medically appropriate examination, counseling and educating patient and/or caregiver, ordering testing , medications, referrals or procedures, my independent interpretation of results, communication of results to patient and /or caregiver, care coordination, and documentation of all of the above.       Briana Reaves MD

## 2024-03-11 DIAGNOSIS — N39.0 URINARY TRACT INFECTION WITHOUT HEMATURIA, SITE UNSPECIFIED: Primary | ICD-10-CM

## 2024-03-25 ENCOUNTER — LAB ENCOUNTER (OUTPATIENT)
Dept: LAB | Age: 69
End: 2024-03-25
Attending: INTERNAL MEDICINE
Payer: MEDICARE

## 2024-03-25 DIAGNOSIS — N39.0 URINARY TRACT INFECTION WITHOUT HEMATURIA, SITE UNSPECIFIED: ICD-10-CM

## 2024-03-25 LAB
BILIRUB UR QL: NEGATIVE
CLARITY UR: CLEAR
GLUCOSE UR-MCNC: NORMAL MG/DL
HGB UR QL STRIP.AUTO: NEGATIVE
KETONES UR-MCNC: NEGATIVE MG/DL
LEUKOCYTE ESTERASE UR QL STRIP.AUTO: NEGATIVE
NITRITE UR QL STRIP.AUTO: NEGATIVE
PH UR: 7 [PH] (ref 5–8)
PROT UR-MCNC: NEGATIVE MG/DL
SP GR UR STRIP: 1.02 (ref 1–1.03)
UROBILINOGEN UR STRIP-ACNC: NORMAL

## 2024-03-25 PROCEDURE — 87186 SC STD MICRODIL/AGAR DIL: CPT

## 2024-03-25 PROCEDURE — 87077 CULTURE AEROBIC IDENTIFY: CPT

## 2024-03-25 PROCEDURE — 81003 URINALYSIS AUTO W/O SCOPE: CPT

## 2024-03-25 PROCEDURE — 87086 URINE CULTURE/COLONY COUNT: CPT

## 2024-03-28 DIAGNOSIS — N39.0 URINARY TRACT INFECTION WITHOUT HEMATURIA, SITE UNSPECIFIED: Primary | ICD-10-CM

## 2024-03-28 RX ORDER — CEPHALEXIN 500 MG/1
500 CAPSULE ORAL 3 TIMES DAILY
Qty: 21 CAPSULE | Refills: 0 | Status: SHIPPED | OUTPATIENT
Start: 2024-03-28 | End: 2024-04-04

## 2024-04-22 ENCOUNTER — LAB ENCOUNTER (OUTPATIENT)
Dept: LAB | Age: 69
End: 2024-04-22
Attending: INTERNAL MEDICINE
Payer: MEDICARE

## 2024-04-22 DIAGNOSIS — N39.0 URINARY TRACT INFECTION WITHOUT HEMATURIA, SITE UNSPECIFIED: ICD-10-CM

## 2024-04-22 LAB
BILIRUB UR QL: NEGATIVE
CLARITY UR: CLEAR
GLUCOSE UR-MCNC: NORMAL MG/DL
HGB UR QL STRIP.AUTO: NEGATIVE
KETONES UR-MCNC: NEGATIVE MG/DL
LEUKOCYTE ESTERASE UR QL STRIP.AUTO: NEGATIVE
NITRITE UR QL STRIP.AUTO: NEGATIVE
PH UR: 5.5 [PH] (ref 5–8)
PROT UR-MCNC: NEGATIVE MG/DL
SP GR UR STRIP: 1.02 (ref 1–1.03)
UROBILINOGEN UR STRIP-ACNC: NORMAL

## 2024-04-22 PROCEDURE — 81003 URINALYSIS AUTO W/O SCOPE: CPT

## 2024-04-22 PROCEDURE — 87086 URINE CULTURE/COLONY COUNT: CPT

## 2024-06-06 ENCOUNTER — OFFICE VISIT (OUTPATIENT)
Dept: INTERNAL MEDICINE CLINIC | Facility: CLINIC | Age: 69
End: 2024-06-06
Payer: MEDICARE

## 2024-06-06 VITALS
WEIGHT: 149.63 LBS | RESPIRATION RATE: 16 BRPM | HEART RATE: 88 BPM | HEIGHT: 66 IN | DIASTOLIC BLOOD PRESSURE: 96 MMHG | TEMPERATURE: 98 F | SYSTOLIC BLOOD PRESSURE: 168 MMHG | BODY MASS INDEX: 24.05 KG/M2

## 2024-06-06 DIAGNOSIS — Z12.39 SCREENING BREAST EXAMINATION: ICD-10-CM

## 2024-06-06 DIAGNOSIS — I10 PRIMARY HYPERTENSION: ICD-10-CM

## 2024-06-06 DIAGNOSIS — Z01.419 ENCOUNTER FOR GYNECOLOGICAL EXAMINATION (GENERAL) (ROUTINE) WITHOUT ABNORMAL FINDINGS: ICD-10-CM

## 2024-06-06 DIAGNOSIS — R30.0 DYSURIA: Primary | ICD-10-CM

## 2024-06-06 DIAGNOSIS — M25.473 ANKLE EDEMA: ICD-10-CM

## 2024-06-06 LAB
APPEARANCE: CLEAR
BILIRUBIN: NEGATIVE
GLUCOSE (URINE DIPSTICK): NEGATIVE MG/DL
KETONES (URINE DIPSTICK): NEGATIVE MG/DL
LEUKOCYTES: NEGATIVE
MULTISTIX LOT#: ABNORMAL NUMERIC
NITRITE, URINE: NEGATIVE
PH, URINE: 5.5 (ref 4.5–8)
PROTEIN (URINE DIPSTICK): NEGATIVE MG/DL
SPECIFIC GRAVITY: 1.01 (ref 1–1.03)
URINE-COLOR: YELLOW
UROBILINOGEN,SEMI-QN: 0.2 MG/DL (ref 0–1.9)

## 2024-06-06 PROCEDURE — 87077 CULTURE AEROBIC IDENTIFY: CPT | Performed by: INTERNAL MEDICINE

## 2024-06-06 PROCEDURE — 87086 URINE CULTURE/COLONY COUNT: CPT | Performed by: INTERNAL MEDICINE

## 2024-06-06 PROCEDURE — 87186 SC STD MICRODIL/AGAR DIL: CPT | Performed by: INTERNAL MEDICINE

## 2024-06-06 PROCEDURE — 99214 OFFICE O/P EST MOD 30 MIN: CPT | Performed by: INTERNAL MEDICINE

## 2024-06-06 PROCEDURE — 81003 URINALYSIS AUTO W/O SCOPE: CPT | Performed by: INTERNAL MEDICINE

## 2024-06-06 NOTE — PROGRESS NOTES
[facility]    CHIEF COMPLAINT:    Chief Complaint   Patient presents with    Other     Medicare Breast and pelvic.  2/15/21-normal pap, neg HPV. 12/19/23-mammo. 2/27/20-colon-repeat 5 years         HISTORY OF PRESENT ILLNESS:  here for breast and pelvic.   Also Complains of uti symptoms. Has burning and pressure.   She had a uti a couple of months ago as well.     No history of abnormal paps.   Pt s/p hysterectomy but still has a cervix.    Ankles have been swelling if she walks a lot. Better in the am.     REVIEW OF SYSTEMS:  See HPI    Current Medications:    Current Outpatient Medications   Medication Sig Dispense Refill    celecoxib 200 MG Oral Cap Take 1 capsule (200 mg total) by mouth daily as needed.      rosuvastatin 10 MG Oral Tab Take 1 tablet (10 mg total) by mouth nightly. 90 tablet 1    levocetirizine 5 MG Oral Tab Take 1 tablet (5 mg total) by mouth every evening. 90 tablet 1    valsartan 80 MG Oral Tab Take 1 tablet (80 mg total) by mouth daily. 90 tablet 0    Omeprazole 40 MG Oral Capsule Delayed Release Take 1 capsule (40 mg total) by mouth daily.      zolpidem 5 MG Oral Tab Take 1 tablet (5 mg total) by mouth nightly as needed for Sleep. 30 tablet 0    Ciclopirox Olamine 0.77 % External Cream as needed.      cholecalciferol 50 MCG (2000 UT) Oral Cap Take 1 capsule (2,000 Units total) by mouth daily.      multivitamin Oral Tab Take 1 tablet by mouth daily.      Calcium Carb-Cholecalciferol (CALCIUM + D3) 600-200 MG-UNIT Oral Tab Take 1 tablet by mouth daily.      Fluticasone Furoate 27.5 MCG/SPRAY Nasal Suspension 2 sprays by Nasal route as needed.      Aspirin 81 MG Oral Tab Take 1 tablet (81 mg total) by mouth daily.      nitrofurantoin monohydrate macro 100 MG Oral Cap Take 1 capsule (100 mg total) by mouth 2 (two) times daily. 14 capsule 0       PAST MEDICAL, SOCIAL, AND FAMILY HISTORY:  Tobacco:    History   Smoking Status    Never   Smokeless Tobacco    Never       PHYSICAL EXAM:  BP  (!) 168/96 (BP Location: Right arm, Patient Position: Sitting, Cuff Size: adult)   Pulse 88   Temp 98.1 °F (36.7 °C) (Temporal)   Resp 16   Ht 5' 6\" (1.676 m)   Wt 149 lb 9.6 oz (67.9 kg)   Breastfeeding No   BMI 24.15 kg/m²    GENERAL: well developed, well nourished,in no apparent distress  BREAST: symmetric, no masses, no tenderness, no nipple discharge.   GENITAL/URINARY:  External Genitalia:  General appearance; normal, Hair distribution; normal, Lesions absent, Urethral Meatus:  Size normal, Location normal, Lesions absent, Prolapse absent, Vagina:  General appearance normal, Lesions absent, Pelvic support normal, Cervix:  General appearance normal, Discharge absent, Tenderness absent, Enlargement absent, uterus surgically absent  Adnexa:  Masses absent, Tenderness absent, Anus/Perineum:  Lesions absent and Masses absent  No pap today.      DATA:  Results for orders placed or performed in visit on 06/06/24   Urine Dip, auto without Micro   Result Value Ref Range    Glucose Urine Negative Negative mg/dL    Bilirubin Urine Negative Negative    Ketones, UA Negative Negative - Trace mg/dL    Spec Gravity 1.015 1.005 - 1.030    Blood Urine Trace-lysed (A) Negative    PH Urine 5.5 5.0 - 8.0    Protein Urine Negative Negative - Trace mg/dL    Urobilinogen Urine 0.2 0.2 - 1.0 mg/dL    Nitrite Urine Negative Negative    Leukocyte Esterase Urine Negative Negative    APPEARANCE clear Clear    Color Urine yellow Yellow    Multistix Lot# [id number] Numeric    Multistix Expiration Date 08/31/2024 Date            ASSESSMENT AND PLAN:  1. Dysuria  Urine dip negative.   Will send for culture.   - Urine Dip, auto without Micro  - Urine Culture, Routine [E]; Future  - SPECIMEN HANDLING,DR OFF->LAB  - Urine Culture, Routine [E]    2. Ankle edema  No edema today.   Likely venous insufficiency.  Discussed conservative management.     3. Screening breast examination  Normal exam.     4. Encounter for gynecological examination  (general) (routine) without abnormal findings  Normal exam.     Elevated bp here. Reading at home today was 124/70. She monitors regularly. Continue same meds.     Return in about 3 months (around 9/6/2024) for annual wellness visit, med check.      Briana Reaves MD

## 2024-06-24 ENCOUNTER — TELEPHONE (OUTPATIENT)
Dept: INTERNAL MEDICINE CLINIC | Facility: CLINIC | Age: 69
End: 2024-06-24

## 2024-06-24 DIAGNOSIS — R30.0 DYSURIA: Primary | ICD-10-CM

## 2024-06-24 DIAGNOSIS — R35.0 FREQUENT URINATION: ICD-10-CM

## 2024-06-24 DIAGNOSIS — N39.0 FREQUENT UTI: ICD-10-CM

## 2024-06-24 NOTE — TELEPHONE ENCOUNTER
Patient has a video visit scheduled with Silke Alexis, a urogyne from [facility], on Wednesday.  The nurse from the urogyne office recommended having a urine culture at an immediate care before Wednesday.  Patient is wondering if Dr Reaves would place an order through [facility] so she doesn't have to go to an immediate care.  Please advise.  Thank you!

## 2024-06-25 ENCOUNTER — LAB ENCOUNTER (OUTPATIENT)
Dept: LAB | Age: 69
End: 2024-06-25
Attending: INTERNAL MEDICINE

## 2024-06-25 DIAGNOSIS — N39.0 FREQUENT UTI: ICD-10-CM

## 2024-06-25 DIAGNOSIS — R30.0 DYSURIA: ICD-10-CM

## 2024-06-25 PROCEDURE — 87086 URINE CULTURE/COLONY COUNT: CPT

## 2024-09-04 ENCOUNTER — LAB ENCOUNTER (OUTPATIENT)
Dept: LAB | Age: 69
End: 2024-09-04
Attending: INTERNAL MEDICINE
Payer: MEDICARE

## 2024-09-04 DIAGNOSIS — E11.9 TYPE 2 DIABETES MELLITUS WITHOUT COMPLICATION, WITHOUT LONG-TERM CURRENT USE OF INSULIN (HCC): ICD-10-CM

## 2024-09-04 DIAGNOSIS — E78.2 MIXED HYPERLIPIDEMIA: ICD-10-CM

## 2024-09-04 LAB
ALBUMIN SERPL-MCNC: 4.4 G/DL (ref 3.2–4.8)
ALBUMIN/GLOB SERPL: 1.6 {RATIO} (ref 1–2)
ALP LIVER SERPL-CCNC: 73 U/L
ALT SERPL-CCNC: 26 U/L
ANION GAP SERPL CALC-SCNC: 5 MMOL/L (ref 0–18)
AST SERPL-CCNC: 21 U/L (ref ?–34)
BILIRUB SERPL-MCNC: 0.6 MG/DL (ref 0.2–1.1)
BUN BLD-MCNC: 15 MG/DL (ref 9–23)
CALCIUM BLD-MCNC: 9.9 MG/DL (ref 8.7–10.4)
CHLORIDE SERPL-SCNC: 106 MMOL/L (ref 98–112)
CHOLEST SERPL-MCNC: 213 MG/DL (ref ?–200)
CO2 SERPL-SCNC: 28 MMOL/L (ref 21–32)
CREAT BLD-MCNC: 0.88 MG/DL
EGFRCR SERPLBLD CKD-EPI 2021: 72 ML/MIN/1.73M2 (ref 60–?)
EST. AVERAGE GLUCOSE BLD GHB EST-MCNC: 148 MG/DL (ref 68–126)
FASTING PATIENT LIPID ANSWER: YES
FASTING STATUS PATIENT QL REPORTED: YES
GLOBULIN PLAS-MCNC: 2.7 G/DL (ref 2–3.5)
GLUCOSE BLD-MCNC: 124 MG/DL (ref 70–99)
HBA1C MFR BLD: 6.8 % (ref ?–5.7)
HDLC SERPL-MCNC: 57 MG/DL (ref 40–59)
LDLC SERPL CALC-MCNC: 117 MG/DL (ref ?–100)
NONHDLC SERPL-MCNC: 156 MG/DL (ref ?–130)
OSMOLALITY SERPL CALC.SUM OF ELEC: 290 MOSM/KG (ref 275–295)
POTASSIUM SERPL-SCNC: 4.5 MMOL/L (ref 3.5–5.1)
PROT SERPL-MCNC: 7.1 G/DL (ref 5.7–8.2)
SODIUM SERPL-SCNC: 139 MMOL/L (ref 136–145)
TRIGL SERPL-MCNC: 226 MG/DL (ref 30–149)
VLDLC SERPL CALC-MCNC: 40 MG/DL (ref 0–30)

## 2024-09-04 PROCEDURE — 80061 LIPID PANEL: CPT

## 2024-09-04 PROCEDURE — 83036 HEMOGLOBIN GLYCOSYLATED A1C: CPT

## 2024-09-04 PROCEDURE — 80053 COMPREHEN METABOLIC PANEL: CPT

## 2024-09-04 PROCEDURE — 36415 COLL VENOUS BLD VENIPUNCTURE: CPT

## 2024-09-09 NOTE — PROGRESS NOTES
Subjective:   Mag Thapa is a 69 year old female who presents for a Subsequent Annual Wellness visit (Pt already had Initial Annual Wellness) and med check.     Pap done 2/2021 negative with negative hpv. S/p hysterectomy but has a cervix. Breast and pelvic done 6/2024.   Mammo done 12/2023. Ordered.  high risk breast cancer advisory, she declines a consultation for this.   Colonoscopy done 2/2020, repeat in 5 years per path report. Done by dr. Carl. Referral placed.   Dexa done 2/2024 showed osteoporosis. Declined meds.       Prevnar 20 due. Declines today, had a reaction with pneumovax 23. High fever and chills for 3 days.   Due shingrix, tdap, rsv. Get at local pharmacy.   Has not had covid vaccines. Declines.     AHA flowsheet reviewed.   No falls.   Memory testing normal.   Mood has been stable. No depression.   Seeing eye doctor yearly.      Htn: Taking meds regularly. No chest pain, no shortness of breath.      Hyperlipidemia: on statin. No muscle aches.  On crestor. LDL not quite at goal. She cannot tolerate a higher dose of statin.      Dm: off metformin. Diet controlled. A1c at goal. Eye exam due 11/2024. Foot exam done.      Gerd: on pantoprazole. Manifests as hoarseness. Managed by her ent.      Ascending aortic dilatation: echo last done 12/2023.       Has a history of simple renal cysts.      Insomnia: on ambien. Takes very sparingly. Works well when she takes it.     Hussain: uses dental device.     126/72    Ankle pain bilaterally when she walks or stands for a long time. No swelling or redness. Has varicose veins.   Also left low back pain when she bends over and gets up too quickly off and on.   Travelling in January, will be walking a lot. Wants to know what she can do.     Had egd with dr. Carl earlier this year. Showed a hiatal hernia and gastric polyps. She is trying to cut down on the ppi.     History/Other:   Fall Risk Assessment:   She has been screened for Falls and is low risk.       Cognitive Assessment:   She had a completely normal cognitive assessment - see flowsheet entries       Functional Ability/Status:   Mag Thapa has a completely normal functional assessment. See flowsheet for details.      Depression Screening (PHQ):  PHQ-2 SCORE: 0  , done 9/3/2024   Last [facility] Suicide Screening on 9/10/2024 was No Risk.         Advanced Directives:   She does have a Living Will but we do NOT have it on file in Epic.    She does have a POA but we do NOT have it on file in Epic.    Discussed with patient and provided information.        Patient Active Problem List   Diagnosis    HTN (hypertension)    Hyperlipidemia    YUSEF (obstructive sleep apnea)    Low vitamin D level    History of CVA with residual deficit    Spondylarthrosis    Ascending aorta dilation (HCC)    Carotid artery plaque, bilateral    Gastroesophageal reflux disease without esophagitis    Type 2 diabetes mellitus without complication, without long-term current use of insulin (HCC)    Atherosclerosis of aorta (HCC)    Renal cyst, left    Primary insomnia     Allergies:  She is allergic to bactrim [sulfamethoxazole w/trimethoprim], codeine phosphate, opioid analgesics, and pneumovax [pneumococcal polysaccharides].    Current Medications:  Outpatient Medications Marked as Taking for the 9/10/24 encounter (Office Visit) with Briana Reaves MD   Medication Sig    estradiol 0.1 MG/GM Vaginal Cream Apply one gram per vagina every other night for two weeks and then twice weekly at bedtime.    rosuvastatin 10 MG Oral Tab Take 1 tablet (10 mg total) by mouth nightly.    levocetirizine 5 MG Oral Tab Take 1 tablet (5 mg total) by mouth every evening.    valsartan 80 MG Oral Tab Take 1 tablet (80 mg total) by mouth daily.    celecoxib 200 MG Oral Cap Take 1 capsule (200 mg total) by mouth daily as needed.    Omeprazole 40 MG Oral Capsule Delayed Release Take 1 capsule (40 mg total) by mouth daily.    zolpidem 5 MG Oral Tab Take 1  tablet (5 mg total) by mouth nightly as needed for Sleep.    Ciclopirox Olamine 0.77 % External Cream as needed.    cholecalciferol 50 MCG (2000 UT) Oral Cap Take 1 capsule (2,000 Units total) by mouth daily.    multivitamin Oral Tab Take 1 tablet by mouth daily.    Calcium Carb-Cholecalciferol (CALCIUM + D3) 600-200 MG-UNIT Oral Tab Take 1 tablet by mouth daily.    Fluticasone Furoate 27.5 MCG/SPRAY Nasal Suspension 2 sprays by Nasal route as needed.    Aspirin 81 MG Oral Tab Take 1 tablet (81 mg total) by mouth daily.       Medical History:  She  has a past medical history of Acute, but ill-defined, cerebrovascular disease (3-10-07), Aortic root dilation ([facility]) (12/15/2020), Arthritis, Carotid artery plaque, bilateral (12/15/2020), Chronic rhinitis, COVID-19 (2022), CTS (carpal tunnel syndrome) (11/10/2010), CVA (cerebral vascular accident) ([facility]) (2007), Esophageal reflux, Female stress incontinence (2007), Gastroesophageal reflux disease without esophagitis (2/15/2021), Hand arthritis (2018), History of gestational diabetes mellitus (GDM) (2/10/2017), Low vitamin D level (2/10/2017), YUSEF on CPAP (2/10/2017), Other and unspecified hyperlipidemia, Pre-diabetes (2/10/2017), Sleep apnea, Spondylarthrosis (2018), Stroke ([facility]) (4/3/2007), Thyroiditis (2008), Type 2 diabetes mellitus without complication, without long-term current use of insulin ([facility]) (2021), and Unspecified essential hypertension.  Surgical History:  She  has a past surgical history that includes hysterectomy (); tonsillectomy (); leep; colonoscopy ();  (); and adenoidectomy.   Family History:  Her family history includes Breast Cancer (age of onset: 50) in her sister; CVA in an other family member; Cancer in her sister; Colon Cancer (age of onset: 70) in her maternal grandfather; Diabetes in her sister; Heart Disorder in her mother; Kidney Disease in her sister; Other in her father; macular  degeneration in her mother.  Social History:  She  reports that she has never smoked. She has never used smokeless tobacco. She reports current alcohol use of about 3.0 standard drinks of alcohol per week. She reports that she does not use drugs.    Tobacco:  She has never smoked tobacco.    CAGE Alcohol Screen:   CAGE screening score of 0 on 9/3/2024, showing low risk of alcohol abuse.      Patient Care Team:  Briana Reaves MD as PCP - General (Internal Medicine)  Jared iGles PT as Physical Therapist (Physical Therapy)  Dyllan Carl MD (GASTROENTEROLOGY)  Margarita Becker APRN (Nurse Practitioner)  Bernard Shabazz MD as Consulting Physician (PULMONARY DISEASES)    Review of Systems  See hpi    Objective:   Physical Exam  General appearance: alert, appears stated age, and cooperative  Lungs: clear to auscultation bilaterally  Heart: S1, S2 normal, no murmur, click, rub or gallop, regular rate and rhythm  Abdomen: soft, non-tender; bowel sounds normal; no masses,  no organomegaly  Extremities:  no edema, muscle strength normal. Pulses normal bilaterally.   Bilateral barefoot skin diabetic exam is normal, visualized feet and the appearance is normal.  Bilateral monofilament/sensation of both feet is normal.  Pulsation pedal pulse exam of both lower legs/feet is normal as well.       BP (!) 160/94 (BP Location: Right arm, Patient Position: Sitting, Cuff Size: adult)   Pulse 86   Temp 97.5 °F (36.4 °C) (Temporal)   Resp 12   Ht 5' 6\" (1.676 m)   Wt 147 lb 1.6 oz (66.7 kg)   Breastfeeding No   BMI 23.74 kg/m²  Estimated body mass index is 23.74 kg/m² as calculated from the following:    Height as of this encounter: 5' 6\" (1.676 m).    Weight as of this encounter: 147 lb 1.6 oz (66.7 kg).    Medicare Hearing Assessment:   Hearing Screening    Screening Method: Finger Rub  Finger Rub Result: Pass               Assessment & Plan:   Mag Thapa is a 69 year old female who presents for a Medicare  Assessment.     1. Encounter for annual health examination  Pap done 2/2021 negative with negative hpv. S/p hysterectomy but has a cervix. Breast and pelvic done 6/2024.   Mammo done 12/2023. Ordered.  high risk breast cancer advisory, she declines a consultation for this.   Colonoscopy done 2/2020, repeat in 5 years per path report. Done by dr. Carl. Referral placed.   Dexa done 2/2024 showed osteoporosis. Declined meds.       Prevnar 20 due. Declines today, had a reaction with pneumovax 23. High fever and chills for 3 days.   Due shingrix, tdap, rsv. Get at local pharmacy.   Has not had covid vaccines. Declines.     AHA flowsheet reviewed.   No falls.   Memory testing normal.   Mood has been stable. No depression.   Seeing eye doctor yearly.      2. Primary hypertension  Continue meds. Labs in 6 months.   - valsartan 80 MG Oral Tab; Take 1 tablet (80 mg total) by mouth daily.  Dispense: 90 tablet; Refill: 1  - CBC W Differential W Platelet [E]; Future  - TSH W Reflex To Free T4 [E]; Future    3. Mixed hyperlipidemia  Continue statin. Cannot tolerate higher dose.   Labs in 6 months.   - rosuvastatin 10 MG Oral Tab; Take 1 tablet (10 mg total) by mouth nightly.  Dispense: 90 tablet; Refill: 1  - CMP in 6 months; Future  - Lipid in 6 months; Future    4. Type 2 diabetes mellitus without complication, without long-term current use of insulin (HCC)  Continue diet control. Labs in 6 months.   - Hemoglobin A1C in 6 months; Future  - Microalb/Creat Ratio, Random Urine in 6 months; Future    5. Gastroesophageal reflux disease without esophagitis  Ppi discussed. She will continue to try to wean.     6. Seasonal allergic rhinitis, unspecified trigger  - levocetirizine 5 MG Oral Tab; Take 1 tablet (5 mg total) by mouth every evening.  Dispense: 90 tablet; Refill: 1    7. Ascending aorta dilatation (HCC)  Echo due 12/2024.   - CARD ECHO 2D DOPPLER (CPT=93306); Future    8. Primary insomnia  Ambien prn.     9. YUSEF  (obstructive sleep apnea)  Continue dental device.     10. Bilateral carotid artery disease, unspecified type (HCC)  - US CAROTID DOPPLER BILAT - DIAG IMG (CPT=93880); Future    11. Chronic pain of both ankles  Discussed conservative management. Offered PT. She declines.     12. Left-sided low back pain without sciatica, unspecified chronicity  Discussed conservative management. Offered PT. She declines.     13. Encounter for screening mammogram for malignant neoplasm of breast  - [facility] 2D+3D SCREENING BILAT (CPT=77067/86023); Future    14. Screening for colon cancer  - Gastro Referral - In Network      The patient indicates understanding of these issues and agrees to the plan.  Reinforced healthy diet, lifestyle, and exercise.      Return in about 6 months (around 3/10/2025) for med check.     Briana Reaves MD, 9/8/2024     Supplementary Documentation:   General Health:  In the past six months, have you lost more than 10 pounds without trying?: 2 - No  Has your appetite been poor?: No  Type of Diet: Balanced;Diabetic;Low Carb  How does the patient maintain a good energy level?: Daily Walks  How would you describe your daily physical activity?: Moderate  How would you describe your current health state?: Good  How do you maintain positive mental well-being?: Social Interaction;Visiting Friends;Visiting Family  On a scale of 0 to 10, with 0 being no pain and 10 being severe pain, what is your pain level?: 2 - (Mild) (both ankles. Right worse than left. Occasional left sided sciatic pain.)  In the past six months, have you experienced urine leakage?: 1-Yes  At any time do you feel concerned for the safety/well-being of yourself and/or your children, in your home or elsewhere?: No  Have you had any immunizations at another office such as Influenza, Hepatitis B, Tetanus, or Pneumococcal?: Yes    Health Maintenance   Topic Date Due    Zoster Vaccines (1 of 2) Never done    Pneumococcal Vaccine: 65+ Years (2 of 2 - PCV)  10/05/2022    Annual Depression Screening  01/01/2024    COVID-19 Vaccine (1 - 2023-24 season) Never done    Diabetes Care Foot Exam  09/06/2024    Annual Physical  09/06/2024    Diabetes Care Dilated Eye Exam  11/21/2024    Colorectal Cancer Screening  02/27/2025    Influenza Vaccine (1) 10/01/2024    Mammogram  12/19/2024    Diabetes Care A1C  03/04/2025    Diabetes Care: Microalb/Creat Ratio  03/04/2025    Diabetes Care: GFR  09/04/2025    Pap Smear  02/15/2026    DEXA Scan  02/28/2026    Fall Risk Screening (Annual)  Completed

## 2024-09-10 ENCOUNTER — OFFICE VISIT (OUTPATIENT)
Dept: INTERNAL MEDICINE CLINIC | Facility: CLINIC | Age: 69
End: 2024-09-10
Payer: MEDICARE

## 2024-09-10 VITALS
SYSTOLIC BLOOD PRESSURE: 160 MMHG | DIASTOLIC BLOOD PRESSURE: 94 MMHG | BODY MASS INDEX: 23.65 KG/M2 | HEIGHT: 66 IN | WEIGHT: 147.13 LBS | RESPIRATION RATE: 12 BRPM | TEMPERATURE: 98 F | HEART RATE: 86 BPM

## 2024-09-10 DIAGNOSIS — K21.9 GASTROESOPHAGEAL REFLUX DISEASE WITHOUT ESOPHAGITIS: ICD-10-CM

## 2024-09-10 DIAGNOSIS — E11.9 TYPE 2 DIABETES MELLITUS WITHOUT COMPLICATION, WITHOUT LONG-TERM CURRENT USE OF INSULIN (HCC): ICD-10-CM

## 2024-09-10 DIAGNOSIS — Z12.31 ENCOUNTER FOR SCREENING MAMMOGRAM FOR MALIGNANT NEOPLASM OF BREAST: ICD-10-CM

## 2024-09-10 DIAGNOSIS — F51.01 PRIMARY INSOMNIA: ICD-10-CM

## 2024-09-10 DIAGNOSIS — I10 PRIMARY HYPERTENSION: ICD-10-CM

## 2024-09-10 DIAGNOSIS — G47.33 OSA (OBSTRUCTIVE SLEEP APNEA): ICD-10-CM

## 2024-09-10 DIAGNOSIS — M25.571 CHRONIC PAIN OF BOTH ANKLES: ICD-10-CM

## 2024-09-10 DIAGNOSIS — E78.2 MIXED HYPERLIPIDEMIA: ICD-10-CM

## 2024-09-10 DIAGNOSIS — Z12.11 SCREENING FOR COLON CANCER: ICD-10-CM

## 2024-09-10 DIAGNOSIS — M54.50 LEFT-SIDED LOW BACK PAIN WITHOUT SCIATICA, UNSPECIFIED CHRONICITY: ICD-10-CM

## 2024-09-10 DIAGNOSIS — Z00.00 ENCOUNTER FOR ANNUAL HEALTH EXAMINATION: Primary | ICD-10-CM

## 2024-09-10 DIAGNOSIS — I77.9 BILATERAL CAROTID ARTERY DISEASE, UNSPECIFIED TYPE (HCC): ICD-10-CM

## 2024-09-10 DIAGNOSIS — J30.2 SEASONAL ALLERGIC RHINITIS, UNSPECIFIED TRIGGER: ICD-10-CM

## 2024-09-10 DIAGNOSIS — M25.572 CHRONIC PAIN OF BOTH ANKLES: ICD-10-CM

## 2024-09-10 DIAGNOSIS — G89.29 CHRONIC PAIN OF BOTH ANKLES: ICD-10-CM

## 2024-09-10 DIAGNOSIS — I77.810 ASCENDING AORTA DILATATION (HCC): ICD-10-CM

## 2024-09-10 PROCEDURE — G0439 PPPS, SUBSEQ VISIT: HCPCS | Performed by: INTERNAL MEDICINE

## 2024-09-10 PROCEDURE — 99214 OFFICE O/P EST MOD 30 MIN: CPT | Performed by: INTERNAL MEDICINE

## 2024-09-10 RX ORDER — ROSUVASTATIN CALCIUM 10 MG/1
10 TABLET, COATED ORAL NIGHTLY
Qty: 90 TABLET | Refills: 1 | Status: SHIPPED | OUTPATIENT
Start: 2024-09-10

## 2024-09-10 RX ORDER — LEVOCETIRIZINE DIHYDROCHLORIDE 5 MG/1
5 TABLET, FILM COATED ORAL EVERY EVENING
Qty: 90 TABLET | Refills: 1 | Status: SHIPPED | OUTPATIENT
Start: 2024-09-10

## 2024-09-10 RX ORDER — ESTRADIOL 0.1 MG/G
CREAM VAGINAL
COMMUNITY
Start: 2024-06-26

## 2024-09-10 RX ORDER — VALSARTAN 80 MG/1
80 TABLET ORAL DAILY
Qty: 90 TABLET | Refills: 1 | Status: SHIPPED | OUTPATIENT
Start: 2024-09-10

## 2024-12-09 ENCOUNTER — TELEPHONE (OUTPATIENT)
Dept: INTERNAL MEDICINE CLINIC | Facility: CLINIC | Age: 69
End: 2024-12-09

## 2024-12-12 ENCOUNTER — TELEPHONE (OUTPATIENT)
Dept: INTERNAL MEDICINE CLINIC | Facility: CLINIC | Age: 69
End: 2024-12-12

## 2024-12-12 DIAGNOSIS — I65.29 STENOSIS OF CAROTID ARTERY, UNSPECIFIED LATERALITY: Primary | ICD-10-CM

## 2024-12-12 NOTE — TELEPHONE ENCOUNTER
[facility] in [place] called to say that the patients ultrasound on carotid artery did not pass medicare because of the bilateral disease artery code. They were wondering if we could put in new diagnosis codes to get it approved. Thanks!

## 2024-12-12 NOTE — TELEPHONE ENCOUNTER
Stenosis of carotid artery, unspecified laterality     Placed new order under dx above. Registration aware and will let us know if anything is needed.

## 2024-12-17 ENCOUNTER — HOSPITAL ENCOUNTER (OUTPATIENT)
Dept: ULTRASOUND IMAGING | Facility: HOSPITAL | Age: 69
Discharge: HOME | End: 2024-12-17
Attending: INTERNAL MEDICINE
Payer: MEDICARE

## 2024-12-17 ENCOUNTER — HOSPITAL ENCOUNTER (OUTPATIENT)
Dept: CV DIAGNOSTICS | Facility: HOSPITAL | Age: 69
Discharge: HOME | End: 2024-12-17
Attending: INTERNAL MEDICINE
Payer: MEDICARE

## 2024-12-17 DIAGNOSIS — I77.810 ASCENDING AORTA DILATATION (HCC): ICD-10-CM

## 2024-12-17 DIAGNOSIS — I65.29 STENOSIS OF CAROTID ARTERY, UNSPECIFIED LATERALITY: ICD-10-CM

## 2024-12-17 PROCEDURE — 93880 EXTRACRANIAL BILAT STUDY: CPT | Performed by: INTERNAL MEDICINE

## 2024-12-17 PROCEDURE — 93306 TTE W/DOPPLER COMPLETE: CPT | Performed by: INTERNAL MEDICINE

## 2025-02-28 DIAGNOSIS — E78.2 MIXED HYPERLIPIDEMIA: ICD-10-CM

## 2025-03-03 RX ORDER — ROSUVASTATIN CALCIUM 10 MG/1
10 TABLET, COATED ORAL NIGHTLY
Qty: 90 TABLET | Refills: 0 | Status: SHIPPED | OUTPATIENT
Start: 2025-03-03

## 2025-03-20 ENCOUNTER — HOSPITAL ENCOUNTER (OUTPATIENT)
Dept: MAMMOGRAPHY | Facility: HOSPITAL | Age: 70
Discharge: HOME | End: 2025-03-20
Attending: INTERNAL MEDICINE
Payer: MEDICARE

## 2025-03-20 DIAGNOSIS — Z12.31 ENCOUNTER FOR SCREENING MAMMOGRAM FOR MALIGNANT NEOPLASM OF BREAST: ICD-10-CM

## 2025-03-20 PROCEDURE — 77067 SCR MAMMO BI INCL CAD: CPT | Performed by: INTERNAL MEDICINE

## 2025-03-20 PROCEDURE — 77063 BREAST TOMOSYNTHESIS BI: CPT | Performed by: INTERNAL MEDICINE

## 2025-04-16 ENCOUNTER — LAB ENCOUNTER (OUTPATIENT)
Dept: LAB | Age: 70
End: 2025-04-16
Attending: INTERNAL MEDICINE
Payer: MEDICARE

## 2025-04-16 DIAGNOSIS — E78.2 MIXED HYPERLIPIDEMIA: ICD-10-CM

## 2025-04-16 DIAGNOSIS — E11.9 TYPE 2 DIABETES MELLITUS WITHOUT COMPLICATION, WITHOUT LONG-TERM CURRENT USE OF INSULIN (HCC): ICD-10-CM

## 2025-04-16 DIAGNOSIS — I10 PRIMARY HYPERTENSION: ICD-10-CM

## 2025-04-16 LAB
ALBUMIN SERPL-MCNC: 4.6 G/DL (ref 3.2–4.8)
ALBUMIN/GLOB SERPL: 1.8 {RATIO} (ref 1–2)
ALP LIVER SERPL-CCNC: 62 U/L (ref 55–142)
ALT SERPL-CCNC: 43 U/L (ref 10–49)
ANION GAP SERPL CALC-SCNC: 10 MMOL/L (ref 0–18)
AST SERPL-CCNC: 32 U/L (ref ?–34)
BASOPHILS # BLD AUTO: 0.09 X10(3) UL (ref 0–0.2)
BASOPHILS NFR BLD AUTO: 1.3 %
BILIRUB SERPL-MCNC: 0.4 MG/DL (ref 0.2–1.1)
BUN BLD-MCNC: 13 MG/DL (ref 9–23)
CALCIUM BLD-MCNC: 9.7 MG/DL (ref 8.7–10.6)
CHLORIDE SERPL-SCNC: 106 MMOL/L (ref 98–112)
CHOLEST SERPL-MCNC: 184 MG/DL (ref ?–200)
CO2 SERPL-SCNC: 27 MMOL/L (ref 21–32)
CREAT BLD-MCNC: 0.87 MG/DL (ref 0.55–1.02)
CREAT UR-SCNC: 66.9 MG/DL
EGFRCR SERPLBLD CKD-EPI 2021: 73 ML/MIN/1.73M2 (ref 60–?)
EOSINOPHIL # BLD AUTO: 0.26 X10(3) UL (ref 0–0.7)
EOSINOPHIL NFR BLD AUTO: 3.9 %
ERYTHROCYTE [DISTWIDTH] IN BLOOD BY AUTOMATED COUNT: 12.3 %
EST. AVERAGE GLUCOSE BLD GHB EST-MCNC: 151 MG/DL (ref 68–126)
FASTING PATIENT LIPID ANSWER: YES
FASTING STATUS PATIENT QL REPORTED: YES
GLOBULIN PLAS-MCNC: 2.5 G/DL (ref 2–3.5)
GLUCOSE BLD-MCNC: 118 MG/DL (ref 70–99)
HBA1C MFR BLD: 6.9 % (ref ?–5.7)
HCT VFR BLD AUTO: 43.2 % (ref 35–48)
HDLC SERPL-MCNC: 57 MG/DL (ref 40–59)
HGB BLD-MCNC: 13.5 G/DL (ref 12–16)
IMM GRANULOCYTES # BLD AUTO: 0.03 X10(3) UL (ref 0–1)
IMM GRANULOCYTES NFR BLD: 0.4 %
LDLC SERPL CALC-MCNC: 87 MG/DL (ref ?–100)
LYMPHOCYTES # BLD AUTO: 1.66 X10(3) UL (ref 1–4)
LYMPHOCYTES NFR BLD AUTO: 24.6 %
MCH RBC QN AUTO: 29.3 PG (ref 26–34)
MCHC RBC AUTO-ENTMCNC: 31.3 G/DL (ref 31–37)
MCV RBC AUTO: 93.9 FL (ref 80–100)
MICROALBUMIN UR-MCNC: <0.3 MG/DL
MONOCYTES # BLD AUTO: 0.55 X10(3) UL (ref 0.1–1)
MONOCYTES NFR BLD AUTO: 8.2 %
NEUTROPHILS # BLD AUTO: 4.15 X10 (3) UL (ref 1.5–7.7)
NEUTROPHILS # BLD AUTO: 4.15 X10(3) UL (ref 1.5–7.7)
NEUTROPHILS NFR BLD AUTO: 61.6 %
NONHDLC SERPL-MCNC: 127 MG/DL (ref ?–130)
OSMOLALITY SERPL CALC.SUM OF ELEC: 297 MOSM/KG (ref 275–295)
PLATELET # BLD AUTO: 248 10(3)UL (ref 150–450)
POTASSIUM SERPL-SCNC: 4.7 MMOL/L (ref 3.5–5.1)
PROT SERPL-MCNC: 7.1 G/DL (ref 5.7–8.2)
RBC # BLD AUTO: 4.6 X10(6)UL (ref 3.8–5.3)
SODIUM SERPL-SCNC: 143 MMOL/L (ref 136–145)
TRIGL SERPL-MCNC: 243 MG/DL (ref 30–149)
TSI SER-ACNC: 1.86 UIU/ML (ref 0.55–4.78)
VLDLC SERPL CALC-MCNC: 39 MG/DL (ref 0–30)
WBC # BLD AUTO: 6.7 X10(3) UL (ref 4–11)

## 2025-04-16 PROCEDURE — 83036 HEMOGLOBIN GLYCOSYLATED A1C: CPT

## 2025-04-16 PROCEDURE — 82043 UR ALBUMIN QUANTITATIVE: CPT

## 2025-04-16 PROCEDURE — 36415 COLL VENOUS BLD VENIPUNCTURE: CPT

## 2025-04-16 PROCEDURE — 80061 LIPID PANEL: CPT

## 2025-04-16 PROCEDURE — 82570 ASSAY OF URINE CREATININE: CPT

## 2025-04-16 PROCEDURE — 84443 ASSAY THYROID STIM HORMONE: CPT

## 2025-04-16 PROCEDURE — 80053 COMPREHEN METABOLIC PANEL: CPT

## 2025-04-16 PROCEDURE — 85025 COMPLETE CBC W/AUTO DIFF WBC: CPT

## 2025-04-18 ENCOUNTER — OFFICE VISIT (OUTPATIENT)
Dept: INTERNAL MEDICINE CLINIC | Facility: CLINIC | Age: 70
End: 2025-04-18
Payer: MEDICARE

## 2025-04-18 ENCOUNTER — LAB ENCOUNTER (OUTPATIENT)
Dept: LAB | Age: 70
End: 2025-04-18
Attending: INTERNAL MEDICINE
Payer: MEDICARE

## 2025-04-18 VITALS
TEMPERATURE: 98 F | HEIGHT: 60.5 IN | SYSTOLIC BLOOD PRESSURE: 154 MMHG | DIASTOLIC BLOOD PRESSURE: 84 MMHG | WEIGHT: 150.63 LBS | RESPIRATION RATE: 12 BRPM | BODY MASS INDEX: 28.81 KG/M2 | HEART RATE: 80 BPM

## 2025-04-18 DIAGNOSIS — R61 NIGHT SWEATS: ICD-10-CM

## 2025-04-18 DIAGNOSIS — E78.2 MIXED HYPERLIPIDEMIA: ICD-10-CM

## 2025-04-18 DIAGNOSIS — M81.0 AGE-RELATED OSTEOPOROSIS WITHOUT CURRENT PATHOLOGICAL FRACTURE: ICD-10-CM

## 2025-04-18 DIAGNOSIS — I10 PRIMARY HYPERTENSION: ICD-10-CM

## 2025-04-18 DIAGNOSIS — F51.01 PRIMARY INSOMNIA: ICD-10-CM

## 2025-04-18 DIAGNOSIS — I77.810 ASCENDING AORTA DILATATION: ICD-10-CM

## 2025-04-18 DIAGNOSIS — E11.9 TYPE 2 DIABETES MELLITUS WITHOUT COMPLICATION, WITHOUT LONG-TERM CURRENT USE OF INSULIN (HCC): ICD-10-CM

## 2025-04-18 DIAGNOSIS — G47.33 OSA (OBSTRUCTIVE SLEEP APNEA): ICD-10-CM

## 2025-04-18 LAB
FOLATE SERPL-MCNC: 16.5 NG/ML (ref 5.4–?)
VIT B12 SERPL-MCNC: 380 PG/ML (ref 211–911)
VIT D+METAB SERPL-MCNC: 28 NG/ML (ref 30–100)

## 2025-04-18 PROCEDURE — G2211 COMPLEX E/M VISIT ADD ON: HCPCS | Performed by: INTERNAL MEDICINE

## 2025-04-18 PROCEDURE — 99214 OFFICE O/P EST MOD 30 MIN: CPT | Performed by: INTERNAL MEDICINE

## 2025-04-18 PROCEDURE — 82746 ASSAY OF FOLIC ACID SERUM: CPT

## 2025-04-18 PROCEDURE — 36415 COLL VENOUS BLD VENIPUNCTURE: CPT

## 2025-04-18 PROCEDURE — 82607 VITAMIN B-12: CPT

## 2025-04-18 PROCEDURE — 82306 VITAMIN D 25 HYDROXY: CPT

## 2025-04-18 RX ORDER — LIDOCAINE 50 MG/G
PATCH TOPICAL
COMMUNITY
Start: 2024-12-23

## 2025-04-18 RX ORDER — VALSARTAN 80 MG/1
80 TABLET ORAL DAILY
Qty: 90 TABLET | Refills: 1 | Status: SHIPPED | OUTPATIENT
Start: 2025-04-18

## 2025-04-18 RX ORDER — ROSUVASTATIN CALCIUM 10 MG/1
10 TABLET, COATED ORAL NIGHTLY
Qty: 90 TABLET | Refills: 1 | Status: SHIPPED | OUTPATIENT
Start: 2025-04-18

## 2025-04-18 RX ORDER — ZOLPIDEM TARTRATE 5 MG/1
5 TABLET ORAL NIGHTLY PRN
Qty: 30 TABLET | Refills: 0 | Status: SHIPPED | OUTPATIENT
Start: 2025-04-18

## 2025-04-18 NOTE — PROGRESS NOTES
[facility]    CHIEF COMPLAINT:    Chief Complaint   Patient presents with    Medication Follow-Up     2/15/21-pap. 3/20/25-mammo. 2/27/20-colon-repeat 5 years              The following individual(s) verbally consented to be recorded using ambient AI listening technology and understand that they can each withdraw their consent to this listening technology at any point by asking the clinician to turn off or pause the recording:    Patient name: Mag Thapa  Additional names:  none          HISTORY OF PRESENT ILLNESS:  here for med check.   Complains of night sweats for 3 months. Soaking. No fever, no weight loss.   Has had about 9 utis over the past year.     Meds for htn, hyperlipidemia, insomnia.   Dm diet controlled. Up to date eye exam. Needs foot exam.     History of Present Illness  She has been experiencing night sweats for the past three months, occurring approximately three times a week. The night sweats are described as 'soaking wet' but do not require her to change clothes, although she uses a rag to wipe off the sweat. She is unsure if the night sweats are related to her recurrent urinary tract infections (UTIs), as she has not kept track of the timing in relation to her UTI episodes.    She has had nine UTIs in the past year and has been on antibiotics frequently, with courses typically lasting three to seven days. Despite the frequent UTIs, no fevers or unintentional weight loss. Recent blood work showed normal thyroid function, no anemia, no protein in the urine, an A1c of 6.9, and slightly elevated triglycerides. No changes in her living conditions or stress levels over the past three to four months.    She is currently taking valsartan 80 mg daily for blood pressure, rosuvastatin for cholesterol, and Ambien for sleep, which she uses irregularly. She also uses a dental device for sleep apnea and is on pantoprazole for acid reflux, managed by another physician.    She mentions a  recent weight gain of seven pounds during vacation, of which she has lost three pounds. Her blood pressure at home was 125/77 mmHg. No chest pains, shortness of breath, fevers, or unintentional weight loss.       REVIEW OF SYSTEMS:  See HPI    Current Medications:    Current Medications[1]    PAST MEDICAL, SOCIAL, AND FAMILY HISTORY:  Tobacco:    History   Smoking Status    Never   Smokeless Tobacco    Never       PHYSICAL EXAM:  /84 (BP Location: Right arm, Patient Position: Sitting, Cuff Size: adult)   Pulse 80   Temp 97.7 °F (36.5 °C) (Temporal)   Resp 12   Ht 5' 0.5\" (1.537 m)   Wt 150 lb 9.6 oz (68.3 kg)   Breastfeeding No   BMI 28.93 kg/m²    GENERAL: well developed, well nourished,in no apparent distress  LUNGS: clear to auscultation  CARDIO: RRR without murmur  GI: good BS's,no masses, HSM or tenderness  MUSCULOSKELETAL:  no edema, muscle strength normal.   Bilateral barefoot skin diabetic exam is normal, visualized feet and the appearance is normal.  Bilateral monofilament/sensation of both feet is normal.  Pulsation pedal pulse exam of both lower legs/feet is normal as well.       DATA:  Results for orders placed or performed in visit on 04/16/25   CMP in 6 months    Collection Time: 04/16/25 10:17 AM   Result Value Ref Range    Glucose 118 (H) 70 - 99 mg/dL    Sodium 143 136 - 145 mmol/L    Potassium 4.7 3.5 - 5.1 mmol/L    Chloride 106 98 - 112 mmol/L    CO2 27.0 21.0 - 32.0 mmol/L    Anion Gap 10 0 - 18 mmol/L    BUN 13 9 - 23 mg/dL    Creatinine 0.87 0.55 - 1.02 mg/dL    Calcium, Total 9.7 8.7 - 10.6 mg/dL    Calculated Osmolality 297 (H) 275 - 295 mOsm/kg    eGFR-Cr 73 >=60 mL/min/1.73m2    AST 32 <34 U/L    ALT 43 10 - 49 U/L    Alkaline Phosphatase 62 55 - 142 U/L    Bilirubin, Total 0.4 0.2 - 1.1 mg/dL    Total Protein 7.1 5.7 - 8.2 g/dL    Albumin 4.6 3.2 - 4.8 g/dL    Globulin  2.5 2.0 - 3.5 g/dL    A/G Ratio 1.8 1.0 - 2.0    Patient Fasting for CMP? Yes    Lipid in 6 months     Collection Time: 04/16/25 10:17 AM   Result Value Ref Range    Cholesterol, Total 184 <200 mg/dL    HDL Cholesterol 57 40 - 59 mg/dL    Triglycerides 243 (H) 30 - 149 mg/dL    LDL Cholesterol 87 <100 mg/dL    VLDL 39 (H) 0 - 30 mg/dL    Non HDL Chol 127 <130 mg/dL    Patient Fasting for Lipid? Yes    Hemoglobin A1C in 6 months    Collection Time: 04/16/25 10:17 AM   Result Value Ref Range    HgbA1C 6.9 (H) <5.7 %    Estimated Average Glucose 151 (H) 68 - 126 mg/dL   Microalb/Creat Ratio, Random Urine in 6 months    Collection Time: 04/16/25 10:17 AM   Result Value Ref Range    Microalbumin, Urine <0.30 mg/dL    Creatinine Ur Random 66.90 mg/dL    Malb/Cre Calc     CBC W Differential W Platelet [E]    Collection Time: 04/16/25 10:17 AM   Result Value Ref Range    WBC 6.7 4.0 - 11.0 x10(3) uL    RBC 4.60 3.80 - 5.30 x10(6)uL    HGB 13.5 12.0 - 16.0 g/dL    HCT 43.2 35.0 - 48.0 %    .0 150.0 - 450.0 10(3)uL    MCV 93.9 80.0 - 100.0 fL    MCH 29.3 26.0 - 34.0 pg    MCHC 31.3 31.0 - 37.0 g/dL    RDW 12.3 %    Neutrophil Absolute Prelim 4.15 1.50 - 7.70 x10 (3) uL    Neutrophil Absolute 4.15 1.50 - 7.70 x10(3) uL    Lymphocyte Absolute 1.66 1.00 - 4.00 x10(3) uL    Monocyte Absolute 0.55 0.10 - 1.00 x10(3) uL    Eosinophil Absolute 0.26 0.00 - 0.70 x10(3) uL    Basophil Absolute 0.09 0.00 - 0.20 x10(3) uL    Immature Granulocyte Absolute 0.03 0.00 - 1.00 x10(3) uL    Neutrophil % 61.6 %    Lymphocyte % 24.6 %    Monocyte % 8.2 %    Eosinophil % 3.9 %    Basophil % 1.3 %    Immature Granulocyte % 0.4 %   TSH W Reflex To Free T4 [E]    Collection Time: 04/16/25 10:17 AM   Result Value Ref Range    TSH 1.864 0.550 - 4.780 uIU/mL            ASSESSMENT AND PLAN:    ICD-10-CM    1. Primary hypertension  I10 valsartan 80 MG Oral Tab      2. Mixed hyperlipidemia  E78.2 rosuvastatin 10 MG Oral Tab      3. Primary insomnia  F51.01 zolpidem 5 MG Oral Tab      4. Type 2 diabetes mellitus without complication, without long-term  current use of insulin (HCC)  E11.9       5. YUSEF (obstructive sleep apnea)  G47.33       6. Night sweats  R61 Vitamin B12 [E]     Folic Acid Serum (Folate)     VITAMIN D, 25-HYDROXY [26754][Q]     CT CHEST+ABDOMEN+PELVIS(ALL [facility] ONLY)(CPT=71260/53220)      7. Age-related osteoporosis without current pathological fracture  M81.0 VITAMIN D, 25-HYDROXY [58613][Q]      8. Ascending aorta dilatation  I77.810             Assessment & Plan  Night Sweats  Experiencing night sweats for three months, differential includes occult infection or cancer. Further evaluation may be needed if initial tests are inconclusive.  - Order B12, folic acid, and vitamin D levels.  - If inconclusive, order CT of chest, abdomen, and pelvis.  - Follow-up in one month to reassess.  - If persistent and CT normal, consider further evaluation for occult infection or cancer, including repeat echocardiogram and blood cultures.    Recurrent Urinary Tract Infections (UTIs)  Nine UTIs in the past year with frequent antibiotic use. No current UTI, and night sweats not associated with UTIs.    Type 2 Diabetes Mellitus  A1c is 6.9%, acceptable for her age. Managing with diet control, recent weight gain but actively working on weight loss. A1c goal is less than 7.5% for her age.  - Continue diet control.  - Monitor A1c levels.    Hypertension  Blood pressure well-controlled at home with valsartan 80 mg daily. In-office readings high, but home readings satisfactory.  - Continue valsartan 80 mg daily.  - Refill valsartan for six months.    Hyperlipidemia  On rosuvastatin for cholesterol management. LDL satisfactory, triglycerides slightly elevated.  - Continue rosuvastatin.  - Refill rosuvastatin for six months.    Ascending aortic dilatation  Echo stable over 3 years.   - Repeat echocardiogram in two to three years unless symptoms develop.    Insomnia  Uses Ambien irregularly for sleep. Discussed risks of long-term use, including increased fall risk and  potential dependency. Aware of risks and uses sparingly.  - Refill Ambien with 30 tablets.  - Discussed potential risks of long-term use.    Gastroesophageal Reflux Disease (GERD)  On pantoprazole for acid reflux, managed by another provider. Attempting to wean off due to stomach polyps.    Sleep Apnea  Uses a dental device for management.      Recording duration: 15 minutes         Return in about 1 month (around 5/18/2025) for follow up.      Briana Reaves MD         [1]   Current Outpatient Medications   Medication Sig Dispense Refill    lidocaine 5 % External Patch       zolpidem 5 MG Oral Tab Take 1 tablet (5 mg total) by mouth nightly as needed for Sleep. 30 tablet 0    valsartan 80 MG Oral Tab Take 1 tablet (80 mg total) by mouth daily. 90 tablet 1    rosuvastatin 10 MG Oral Tab Take 1 tablet (10 mg total) by mouth nightly. 90 tablet 1    estradiol 0.1 MG/GM Vaginal Cream Apply one gram per vagina every other night for two weeks and then twice weekly at bedtime.      levocetirizine 5 MG Oral Tab Take 1 tablet (5 mg total) by mouth every evening. 90 tablet 1    celecoxib 200 MG Oral Cap Take 1 capsule (200 mg total) by mouth daily as needed.      Omeprazole 40 MG Oral Capsule Delayed Release Take 1 capsule (40 mg total) by mouth daily.      Ciclopirox Olamine 0.77 % External Cream as needed.      cholecalciferol 50 MCG (2000 UT) Oral Cap Take 1 capsule (2,000 Units total) by mouth daily.      multivitamin Oral Tab Take 1 tablet by mouth daily.      Calcium Carb-Cholecalciferol (CALCIUM + D3) 600-200 MG-UNIT Oral Tab Take 1 tablet by mouth daily.      Fluticasone Furoate 27.5 MCG/SPRAY Nasal Suspension 2 sprays by Nasal route as needed.      Aspirin 81 MG Oral Tab Take 1 tablet (81 mg total) by mouth daily.

## 2025-05-07 ENCOUNTER — TELEPHONE (OUTPATIENT)
Dept: INTERNAL MEDICINE CLINIC | Facility: CLINIC | Age: 70
End: 2025-05-07

## 2025-06-04 ENCOUNTER — LAB ENCOUNTER (OUTPATIENT)
Dept: LAB | Age: 70
End: 2025-06-04
Attending: INTERNAL MEDICINE
Payer: MEDICARE

## 2025-06-04 ENCOUNTER — OFFICE VISIT (OUTPATIENT)
Dept: INTERNAL MEDICINE CLINIC | Facility: CLINIC | Age: 70
End: 2025-06-04
Payer: MEDICARE

## 2025-06-04 VITALS
SYSTOLIC BLOOD PRESSURE: 160 MMHG | TEMPERATURE: 98 F | HEART RATE: 95 BPM | HEIGHT: 60.05 IN | RESPIRATION RATE: 20 BRPM | DIASTOLIC BLOOD PRESSURE: 90 MMHG | BODY MASS INDEX: 29.26 KG/M2 | WEIGHT: 151 LBS | OXYGEN SATURATION: 98 %

## 2025-06-04 DIAGNOSIS — N39.0 FREQUENT UTI: ICD-10-CM

## 2025-06-04 DIAGNOSIS — M25.571 ARTHRALGIA OF BOTH ANKLES: ICD-10-CM

## 2025-06-04 DIAGNOSIS — I10 PRIMARY HYPERTENSION: ICD-10-CM

## 2025-06-04 DIAGNOSIS — M25.572 ARTHRALGIA OF BOTH ANKLES: ICD-10-CM

## 2025-06-04 DIAGNOSIS — K76.0 FATTY LIVER: ICD-10-CM

## 2025-06-04 DIAGNOSIS — R61 NIGHT SWEATS: ICD-10-CM

## 2025-06-04 LAB
BILIRUB UR QL STRIP.AUTO: NEGATIVE
CLARITY UR REFRACT.AUTO: CLEAR
COLOR UR AUTO: YELLOW
ERYTHROCYTE [SEDIMENTATION RATE] IN BLOOD: 9 MM/HR (ref 0–30)
GLUCOSE UR STRIP.AUTO-MCNC: NORMAL MG/DL
KETONES UR STRIP.AUTO-MCNC: NEGATIVE MG/DL
LEUKOCYTE ESTERASE UR QL STRIP.AUTO: NEGATIVE
NITRITE UR QL STRIP.AUTO: NEGATIVE
PH UR STRIP.AUTO: 6 [PH] (ref 5–8)
PROT UR STRIP.AUTO-MCNC: NEGATIVE MG/DL
RBC UR QL AUTO: NEGATIVE
RHEUMATOID FACT SERPL-ACNC: <3.5 IU/ML (ref ?–14)
SP GR UR STRIP.AUTO: 1.03 (ref 1–1.03)
UROBILINOGEN UR STRIP.AUTO-MCNC: NORMAL MG/DL

## 2025-06-04 PROCEDURE — 86225 DNA ANTIBODY NATIVE: CPT

## 2025-06-04 PROCEDURE — 87077 CULTURE AEROBIC IDENTIFY: CPT

## 2025-06-04 PROCEDURE — G2211 COMPLEX E/M VISIT ADD ON: HCPCS | Performed by: INTERNAL MEDICINE

## 2025-06-04 PROCEDURE — 36415 COLL VENOUS BLD VENIPUNCTURE: CPT

## 2025-06-04 PROCEDURE — 86431 RHEUMATOID FACTOR QUANT: CPT

## 2025-06-04 PROCEDURE — 81003 URINALYSIS AUTO W/O SCOPE: CPT

## 2025-06-04 PROCEDURE — 86038 ANTINUCLEAR ANTIBODIES: CPT

## 2025-06-04 PROCEDURE — 85652 RBC SED RATE AUTOMATED: CPT

## 2025-06-04 PROCEDURE — 87086 URINE CULTURE/COLONY COUNT: CPT

## 2025-06-04 PROCEDURE — 86200 CCP ANTIBODY: CPT

## 2025-06-04 PROCEDURE — 99214 OFFICE O/P EST MOD 30 MIN: CPT | Performed by: INTERNAL MEDICINE

## 2025-06-04 PROCEDURE — 87186 SC STD MICRODIL/AGAR DIL: CPT

## 2025-06-04 NOTE — PROGRESS NOTES
[facility]    CHIEF COMPLAINT:    Chief Complaint   Patient presents with    Blood Pressure     2/15/21-pap. 3/20/25-mammo. 2/27/20-colon-repeat 5 years           HISTORY OF PRESENT ILLNESS:  here for follow up.   Night sweats: much better. Almost resolved.   She brought in a history of her uti's. Her night sweats are always when she has a uti. She discussed this with her urogyne and they reassured her that this was not uncommon.   She had a ct abdomen pelvis along with her ct urogram.   She did not have the ct chest. Due to difficulty coordinating the 2 CTs. She is wondering if she still needs it given her night sweats are so much better.     She has uti symptoms again. Requests a urine test.   She will be seeing ID soon for her recurrent uti's.     Htn: Taking meds regularly. No chest pain, no shortness of breath. Elevated here. Always elevated here. 129/72 this am at home.     She Complains of arthralgia in hands and ankles. Ususally in the am when she first wakes up. Then gets better through the course of the day. Has pain in ankles when she walks. No redness, no swelling.     Her CT showed a fatty liver. She wants to know what she can do about this.   It also showed simple renal cysts. Showed diverticulosis.     REVIEW OF SYSTEMS:  See HPI    Current Medications:    Current Medications[1]    PAST MEDICAL, SOCIAL, AND FAMILY HISTORY:  Tobacco:    History   Smoking Status    Never   Smokeless Tobacco    Never       PHYSICAL EXAM:  /90 (BP Location: Left arm, Patient Position: Sitting, Cuff Size: adult)   Pulse 95   Temp 97.8 °F (36.6 °C) (Temporal)   Resp 20   Ht 5' 0.05\" (1.525 m)   Wt 151 lb (68.5 kg)   SpO2 98%   BMI 29.44 kg/m²    GENERAL: well developed, well nourished,in no apparent distress  LUNGS: clear to auscultation  CARDIO: RRR without murmur  EXTREMITIES:  no edema, muscle strength normal. No swelling of joints of hand, no ankle swelling, no tenderness.     DATA:  Results for  orders placed or performed in visit on 04/18/25   Vitamin B12 [E]    Collection Time: 04/18/25  2:05 PM   Result Value Ref Range    Vitamin B12 380 211 - 911 pg/mL   Folic Acid Serum (Folate)    Collection Time: 04/18/25  2:05 PM   Result Value Ref Range    Folate (Folic Acid) 16.5 >5.4 ng/mL   VITAMIN D, 25-HYDROXY [35529][Q]    Collection Time: 04/18/25  2:05 PM   Result Value Ref Range    Vitamin D, 25OH, Total 28.0 (L) 30.0 - 100.0 ng/mL            ASSESSMENT AND PLAN:  1. Night sweats  Improved.   Correlate to her uti's.   Okay to hold off on ct chest.   Monitor.     2. Frequent UTI  follow up with id as planned.   - Urinalysis, Routine [E]; Future  - Urine Culture, Routine [E]; Future    3. Primary hypertension  Continue valsartan  Well controlled at home     4. Arthralgia of both ankles  Will check for autoimmune arthritis as requested by pt.   This is likely oa.   Discussed conservative measures.   - Connective Tissue Disease (JATINDER) Screen, Reflex Specific Antibody ([place]); Future  - Sed Rate, W[place]gren (Automated); Future  - Rheumatoid Arthritis Factor; Future  - Cyclic Citrullinate Peptide (CCP) antibodies; Future    5. Fatty liver  Low fat low carb diet.   Regular exercise.   Needs weight loss.   Liver enzymes normal.     Return in about 3 months (around 9/4/2025) for annual wellness visit, med check.      Briana Reaves MD         [1]   Current Outpatient Medications   Medication Sig Dispense Refill    cholecalciferol 25 MCG (1000 UT) Oral Tab Take 3 tablets (3,000 Units total) by mouth in the morning.      cyanocobalamin 1000 MCG Oral Tab Take 1 tablet (1,000 mcg total) by mouth daily.      lidocaine 5 % External Patch       zolpidem 5 MG Oral Tab Take 1 tablet (5 mg total) by mouth nightly as needed for Sleep. 30 tablet 0    valsartan 80 MG Oral Tab Take 1 tablet (80 mg total) by mouth daily. 90 tablet 1    rosuvastatin 10 MG Oral Tab Take 1 tablet (10 mg total) by mouth nightly. 90 tablet 1     estradiol 0.1 MG/GM Vaginal Cream Apply one gram per vagina every other night for two weeks and then twice weekly at bedtime.      levocetirizine 5 MG Oral Tab Take 1 tablet (5 mg total) by mouth every evening. 90 tablet 1    celecoxib 200 MG Oral Cap Take 1 capsule (200 mg total) by mouth daily as needed.      Omeprazole 40 MG Oral Capsule Delayed Release Take 1 capsule (40 mg total) by mouth daily.      Ciclopirox Olamine 0.77 % External Cream as needed.      multivitamin Oral Tab Take 1 tablet by mouth daily.      Calcium Carb-Cholecalciferol (CALCIUM + D3) 600-200 MG-UNIT Oral Tab Take 1 tablet by mouth daily.      Fluticasone Furoate 27.5 MCG/SPRAY Nasal Suspension 2 sprays by Nasal route as needed.      Aspirin 81 MG Oral Tab Take 1 tablet (81 mg total) by mouth daily.

## 2025-06-06 LAB
CCP IGG SERPL-ACNC: 1.4 U/ML (ref 0–6.9)
DSDNA IGG SERPL IA-ACNC: 1.4 IU/ML (ref ?–10)
ENA AB SER QL IA: 0.3 UG/L (ref ?–0.7)
ENA AB SER QL IA: NEGATIVE

## 2025-06-11 ENCOUNTER — MED REC SCAN ONLY (OUTPATIENT)
Dept: INTERNAL MEDICINE CLINIC | Facility: CLINIC | Age: 70
End: 2025-06-11

## 2025-06-16 ENCOUNTER — OFFICE VISIT (OUTPATIENT)
Dept: INTERNAL MEDICINE CLINIC | Facility: CLINIC | Age: 70
End: 2025-06-16
Payer: MEDICARE

## 2025-06-16 VITALS
OXYGEN SATURATION: 95 % | DIASTOLIC BLOOD PRESSURE: 78 MMHG | HEART RATE: 119 BPM | TEMPERATURE: 99 F | WEIGHT: 149.19 LBS | BODY MASS INDEX: 29 KG/M2 | SYSTOLIC BLOOD PRESSURE: 126 MMHG

## 2025-06-16 DIAGNOSIS — B34.9 VIRAL SYNDROME: Primary | ICD-10-CM

## 2025-06-16 DIAGNOSIS — R94.6 ABNORMAL FINDING ON EXAMINATION OF THYROID GLAND: ICD-10-CM

## 2025-06-16 DIAGNOSIS — J02.9 SORE THROAT: ICD-10-CM

## 2025-06-16 LAB
CONTROL LINE PRESENT WITH A CLEAR BACKGROUND (YES/NO): YES YES/NO
KIT LOT #: NORMAL NUMERIC
STREP GRP A CUL-SCR: NEGATIVE

## 2025-06-16 PROCEDURE — 87637 SARSCOV2&INF A&B&RSV AMP PRB: CPT | Performed by: STUDENT IN AN ORGANIZED HEALTH CARE EDUCATION/TRAINING PROGRAM

## 2025-06-16 RX ORDER — CEFUROXIME AXETIL 250 MG/1
250 TABLET ORAL 2 TIMES DAILY
Qty: 14 TABLET | Refills: 0 | Status: SHIPPED | OUTPATIENT
Start: 2025-06-16

## 2025-06-16 RX ORDER — BENZONATATE 200 MG/1
200 CAPSULE ORAL 3 TIMES DAILY PRN
Qty: 30 CAPSULE | Refills: 0 | Status: SHIPPED | OUTPATIENT
Start: 2025-06-16

## 2025-06-16 RX ORDER — MELOXICAM 15 MG/1
15 TABLET ORAL DAILY
Qty: 10 TABLET | Refills: 0 | Status: SHIPPED | OUTPATIENT
Start: 2025-06-16

## 2025-06-16 NOTE — PATIENT INSTRUCTIONS
VISIT SUMMARY:  During your visit, we discussed your ongoing symptoms of sore throat, nasal congestion, and cough, which have persisted for eight days. We also addressed a possible thyroid nodule found during your examination.    YOUR PLAN:  -UPPER RESPIRATORY TRACT INFECTION: You have symptoms of an upper respiratory tract infection, which includes sore throat, nasal congestion, and cough. This type of infection is usually caused by a virus and can last over a week. We will perform a rapid strep test and send out tests for flu, COVID-19, and RSV. If your symptoms worsen over the next three days, you should start the prescribed antibiotics. For your cough, you can take benzonatate. Use saline nasal spray and fluticasone nasal spray for nasal congestion. Lozenges and salt water gargles can help soothe your sore throat. For pain, take meloxicam with meals. You can also take Benadryl 25 mg at night to help with congestion and sleep.    -THYROID NODULE: A possible thyroid nodule was found during your examination. A thyroid nodule is a growth in the thyroid gland. Your thyroid function tests are normal. Please monitor for any changes in size or symptoms.    INSTRUCTIONS:  Please follow up with the recommended tests: rapid strep test, flu, COVID-19, and RSV tests. Start the prescribed antibiotics if your symptoms worsen over the next three days.   Contains text generated by Kana

## 2025-06-16 NOTE — PROGRESS NOTES
OFFICE NOTE       The following individual(s) verbally consented to be recorded using ambient AI listening technology and understand that they can each withdraw their consent to this listening technology at any point by asking the clinician to turn off or pause the recording:    Patient name: Mag Thapa          Patient ID: Mag Thapa is a 69 year old female.  Today's Date: 06/16/25  Chief Complaint: Cold, Sore Throat, Chest Congestion, and Cough (Pt has symptoms for 8 days, low grade fever, Saturday Covid test was negative.)    History of Present Illness  Sushila Thapa is a 69 year old female who presents with sore throat, nasal congestion, and cough.    She has been experiencing symptoms for about eight days, starting with a sore throat and nasal congestion, which progressed to her chest. The sore throat temporarily resolved but returned with a cough. The cough has improved, but the sore throat persists, causing significant discomfort.    She describes nasal congestion with yellow-green sputum when coughing. She had a low-grade fever of 100.5°F on Saturday, which has since resolved. She reports left ear congestion and occasional pain, but no current ear pain. No eye redness or issues, and no current shortness of breath, though she experienced it during coughing episodes.    She recently completed a course of cephalexin for a urinary tract infection, which has improved. She has been using cortisone and HBP tablets for congestion and NyQuil at night for the cough. She also uses saline solution and Veramist (fluticasone) for nasal symptoms.    She reports muscle aches, back pain, and fatigue, describing the onset as 'very fluish'. She experiences headaches and difficulty sleeping due to the sore throat. No skin rash, diarrhea, nausea, or vomiting.    Her sister, who attended a graduation party with her, also developed similar symptoms, but no other family members are reported to be  ill.    She has a known allergy to sulfa drugs and cannot tolerate codeine or other opioids, as they cause significant adverse effects.       Vitals:    06/16/25 1306   BP: 126/78   Pulse: 119   Temp: 98.6 °F (37 °C)   TempSrc: Temporal   SpO2: 95%   Weight: 149 lb 3.2 oz (67.7 kg)     body mass index is 29.09 kg/m².  BP Readings from Last 3 Encounters:   06/16/25 126/78   06/04/25 160/90   04/18/25 154/84     The ASCVD Risk score (Lb HEARD, et al., 2019) failed to calculate for the following reasons:    Risk score cannot be calculated because patient has a medical history suggesting prior/existing ASCVD  Results  LABS  COVID-19 Rapid Test: Negative (06/14/2025)       Medications reviewed:  Current Medications[1]    Assessment & Plan  Upper Respiratory Tract Infection  Possible sinusitis  Sore throat  Symptoms consistent with an upper respiratory tract infection, including sore throat, nasal congestion, cough, and post-nasal drip, persisting for eight days. Initial COVID-19 test negative. Recent UTI treated with cephalexin, suboptimal for sinus infections. Differential diagnosis includes viral infection, sinusitis, or streptococcal infection. Reports yellow-green sputum, nasal discharge, and mild ear discomfort. No fever or chest tightness. Lungs clear on examination. Viral infections can last over a week; symptoms may be due to a new viral infection. Antibiotics to be started if symptoms worsen over the next three days.  - Perform rapid strep test.  - Send out flu, COVID-19, and RSV tests.  - Prescribe antibiotics to be started if symptoms worsen over the next three days.  - Prescribe benzonatate for cough.  - Advise use of saline nasal spray and fluticasone nasal spray.  - Recommend lozenges and salt water gargles for sore throat.  - Prescribe meloxicam for sore throat pain, to be taken with meals.  - Advise taking Benadryl 25 mg at night for congestion and to aid sleep.    Thyroid exam abnormal  Possible thyroid  nodule palpated during examination. Normal thyroid function testing.  Ultrasound needed to evaluate nodule, but patient wants to defer for now.   - Monitor for changes.  - Recommend future ultrasound of the thyroid to evaluate the nodule.  - Advise monitoring for any changes in size or symptoms.       Follow Up: As needed/if symptoms worsen or No follow-ups on file..        There is a longitudinal care relationship with me, the care plan reflects the ongoing nature of the continuous relationship of care, and the medical record indicates that there is ongoing treatment of a serious/complex medical condition which I am currently managing.  is Applicable.     Objective/ Results:   Physical Exam  Vitals reviewed.   Constitutional:       General: She is not in acute distress.     Appearance: Normal appearance. She is not ill-appearing.   HENT:      Head: Normocephalic and atraumatic.      Right Ear: Tympanic membrane, ear canal and external ear normal.      Left Ear: There is impacted cerumen.      Nose: Congestion present.      Right Turbinates: Swollen. Not enlarged or pale.      Left Turbinates: Swollen. Not enlarged or pale.      Mouth/Throat:      Pharynx: Posterior oropharyngeal erythema present.   Eyes:      Extraocular Movements: Extraocular movements intact.      Conjunctiva/sclera: Conjunctivae normal.      Pupils: Pupils are equal, round, and reactive to light.   Cardiovascular:      Rate and Rhythm: Normal rate and regular rhythm.      Heart sounds: No murmur heard.     No gallop.   Pulmonary:      Effort: Pulmonary effort is normal. No respiratory distress.      Breath sounds: Normal breath sounds. No stridor. No wheezing, rhonchi or rales.   Musculoskeletal:      Right lower leg: No edema.      Left lower leg: No edema.   Skin:     General: Skin is warm.      Capillary Refill: Capillary refill takes less than 2 seconds.   Neurological:      General: No focal deficit present.      Mental Status: She is  alert and oriented to person, place, and time.   Psychiatric:         Mood and Affect: Mood normal.         Behavior: Behavior normal.         Thought Content: Thought content normal.         Judgment: Judgment normal.        Physical Exam  VITALS: T- 98.6  HEENT: Cerumen impaction present. Throat inflamed. Nasal discharge present. Nasal mucosa dry.  NECK: Lymph nodes slightly enlarged.  CHEST: Lungs clear to auscultation bilaterally.     Reviewed:    Patient Active Problem List    Diagnosis    Renal cyst, left    Primary insomnia    Type 2 diabetes mellitus without complication, without long-term current use of insulin (HCC)    Gastroesophageal reflux disease without esophagitis     W/hoarseness and occ dysphagia  EGD 2020 neg except gastritis. Hoarseness no response to PPI      Ascending aorta dilation     3.5 cm 2020 on ECHO      Carotid artery plaque, bilateral     <50% 2020      Spondylarthrosis    YUSEF (obstructive sleep apnea)     CPAP      Low vitamin D level    History of CVA with residual deficit     See Dr Koenig's dictation in 2013 for excellent re-cap of event in 2007    dxed 3/16/207  Initial deficit left sided weakness  Residual writing difficulties without any other neuro deficits        Last Assessment & Plan:   Formatting of this note might be different from the original.  Stroke - Assessment & Plan Note by Shweta Simon MD at 12/18/2014 10:22 AM     Author:  Shweta Simon MD Service:  (none) Author Type:  Physician     Filed:  12/18/2014 10:22 AM Note Time:  12/18/2014 10:22 AM Status:  Written     :  Shweta Simon MD (Physician)          Sees neurologist annually at [place]      Atherosclerosis of aorta     Last Assessment & Plan:   Formatting of this note is different from the original.  Atherosclerosis of Aorta - Assessment & Plan Note by Shweta Simon MD at 12/18/2014 10:24 AM     Author:  Shweta Simon MD Service:  (none) Author Type:  Physician     Filed:  12/18/2014  10:24 AM Note Time:  12/18/2014 10:24 AM Status:  Written     :  Shweta Simon MD (Physician)          No chest pain      HTN (hypertension)     Started on meds after cva 2007  On valsartan 80 mg qd        Last Assessment & Plan:   Formatting of this note might be different from the original.  HTN (hypertension) - Assessment & Plan Note by Shweta Simon MD at 8/22/2016  1:39 PM     Author:  Shweta Simon MD Service:  (none) Author Type:  Physician     Filed:  8/22/2016  1:39 PM Note Time:  8/22/2016  1:39 PM Status:  Written     :  Shweta Simon MD (Physician)          bp under good control.  Will cpm      Hyperlipidemia     Started 10/2007        Last Assessment & Plan:   Formatting of this note might be different from the original.  Hyperlipidemia - Assessment & Plan Note by Shweta Simon MD at 8/22/2016  1:38 PM     Author:  Shweta Simon MD Service:  (none) Author Type:  Physician     Filed:  8/22/2016  1:38 PM Note Time:  8/22/2016  1:38 PM Status:  Written     :  Shweta Simon MD (Physician)          Last Cholesterol:  CHOLESTEROL:174 (12/11/2015), LDL CHOL (CALC):84 (12/11/2015), HDL CHOLESTEROL:56 (12/11/2015), TRIGLYCERIDE:155* (8/12/2011);  TRIGLYCERIDES:172 (12/11/2015), Non-HDL Cholesterol:118 (12/11/2015)   Will recheck cholesterol panel today        Allergies[2]   Short Social Hx on File[3]   Review of Systems   HENT:  Positive for congestion, ear pain, postnasal drip, sinus pressure, sinus pain and sore throat.    Eyes: Negative.    Respiratory:  Positive for cough (yellow green). Negative for chest tightness, shortness of breath and wheezing.    Gastrointestinal:  Negative for diarrhea, nausea and vomiting.   Musculoskeletal:  Positive for arthralgias and myalgias.   Skin: Negative.    Neurological:  Positive for headaches. Negative for dizziness and light-headedness.   Hematological:  Negative for adenopathy.       All other systems negative unless otherwise  stated in ROS or HPI above.       Phoebe Suarez MD  Internal Medicine       Call office with any questions or seek emergency care if necessary.   Patient understands and agrees to follow directions and advice.      ----------------------------------------- PATIENT INSTRUCTIONS-----------------------------------------     There are no Patient Instructions on file for this visit.        The 21st Century Cures Act makes medical notes available to patients in the interest of transparency.  However, please be advised that this is a medical document.  It is intended as a peer to peer communication.  It is written in medical language and may contain abbreviations or verbiage that are technical and unfamiliar.  It may appear blunt or direct.  Medical documents are intended to carry relevant information, facts as evident, and the clinical opinion of the practitioner.          [1]   Current Outpatient Medications   Medication Sig Dispense Refill    cholecalciferol 25 MCG (1000 UT) Oral Tab Take 3 tablets (3,000 Units total) by mouth in the morning.      cyanocobalamin 1000 MCG Oral Tab Take 1 tablet (1,000 mcg total) by mouth daily.      zolpidem 5 MG Oral Tab Take 1 tablet (5 mg total) by mouth nightly as needed for Sleep. 30 tablet 0    valsartan 80 MG Oral Tab Take 1 tablet (80 mg total) by mouth daily. 90 tablet 1    rosuvastatin 10 MG Oral Tab Take 1 tablet (10 mg total) by mouth nightly. 90 tablet 1    estradiol 0.1 MG/GM Vaginal Cream Apply one gram per vagina every other night for two weeks and then twice weekly at bedtime.      levocetirizine 5 MG Oral Tab Take 1 tablet (5 mg total) by mouth every evening. 90 tablet 1    celecoxib 200 MG Oral Cap Take 1 capsule (200 mg total) by mouth daily as needed.      Omeprazole 40 MG Oral Capsule Delayed Release Take 1 capsule (40 mg total) by mouth daily.      Ciclopirox Olamine 0.77 % External Cream as needed.      multivitamin Oral Tab Take 1 tablet by mouth daily.       Calcium Carb-Cholecalciferol (CALCIUM + D3) 600-200 MG-UNIT Oral Tab Take 1 tablet by mouth daily.      Fluticasone Furoate 27.5 MCG/SPRAY Nasal Suspension 2 sprays by Nasal route as needed.      Aspirin 81 MG Oral Tab Take 1 tablet (81 mg total) by mouth daily.      lidocaine 5 % External Patch  (Patient not taking: Reported on 6/16/2025)     [2]   Allergies  Allergen Reactions    Bactrim [Sulfamethoxazole W/Trimethoprim] RASH and TONGUE SWELLING     Took 2 benadryl and swelling went down within a few hours     Codeine Phosphate NAUSEA AND VOMITING    Opioid Analgesics NAUSEA AND VOMITING     headaches    Pneumovax [Pneumococcal Polysaccharides] FEVER     Bad reaction with high fever and chills for 3 days.    [3]   Social History  Socioeconomic History    Marital status:    Tobacco Use    Smoking status: Never    Smokeless tobacco: Never   Vaping Use    Vaping status: Never Used   Substance and Sexual Activity    Alcohol use: Not Currently     Comment: approx 1 drink per week    Drug use: No   Other Topics Concern    Caffeine Concern No    Stress Concern No    Weight Concern Yes    Special Diet No    Exercise Yes    Seat Belt Yes     Social Drivers of Health     Food Insecurity: No Food Insecurity (4/18/2025)    NCSS - Food Insecurity     Worried About Running Out of Food in the Last Year: No     Ran Out of Food in the Last Year: No   Transportation Needs: No Transportation Needs (4/18/2025)    NCSS - Transportation     Lack of Transportation: No   Housing Stability: Not At Risk (4/18/2025)    NCSS - Housing/Utilities     Has Housing: Yes     Worried About Losing Housing: No     Unable to Get Utilities: No      Implemented All Universal Safety Interventions:  [place] to call system. Call bell, personal items and telephone within reach. Instruct patient to call for assistance. Room bathroom lighting operational. Non-slip footwear when patient is off stretcher. Physically safe environment: no spills, clutter or unnecessary equipment. Stretcher in lowest position, wheels locked, appropriate side rails in place.

## 2025-06-17 LAB
FLUAV + FLUBV RNA SPEC NAA+PROBE: NOT DETECTED
FLUAV + FLUBV RNA SPEC NAA+PROBE: NOT DETECTED
RSV RNA SPEC NAA+PROBE: NOT DETECTED
SARS-COV-2 RNA RESP QL NAA+PROBE: NOT DETECTED

## (undated) DIAGNOSIS — I10 PRIMARY HYPERTENSION: ICD-10-CM

## (undated) DIAGNOSIS — E78.2 MIXED HYPERLIPIDEMIA: Primary | ICD-10-CM

## (undated) NOTE — LETTER
09/13/21    [address]  [place] [address]    Dear Deonte,    [facility] records indicate that you have outstanding lab work.   To complete your labs before your next office visit on 10/5/21,  please call Central Scheduling at [phone]

## (undated) NOTE — LETTER
11/17/21        [address]  [place] [address]      Dear Deonte,    This is a friendly reminder to let you know it's time to schedule your Annual Mammogram.  To schedule an appointment, please call Central Scheduling at [phone]

## (undated) NOTE — LETTER
Diabetes Retinal Eye Examination Report    Patient Name: Mag Thapa                                        : 1955    Referring Physician: Briana Reaves MD  _____________________________________________________________________________  Patient - Please bring this form to your next eye examination appointment.   Give it to your eye care professional to fill out and return via fax to your primary care provider.     Eye Care Professional - Please fill out this form and fax it back to the referring  primary care physician.   _____________________________________________________________________________     Date of Exam: ___/___/___    The patient received a dilated fundus examination with the following results:    ___ No diabetic retinopathy detected  ___ Background retinopathy was detected, but only requires monitoring  ___ Retinopathy requiring further testing and/or treatment was detected. See notes below.    Notes / Commentary / Recommendations:  _________________________________________________________________________________________________________________________________________________________________________________________________________________________________    The patient is to return for follow-up / evaluation in ____ months.    Eye Care Provider (Print): _______________________Signature___________________ Date ___ / ___/___    Clinic/Office name: _______________________Ph:_____________Fax:_____________

## (undated) NOTE — Clinical Note
Thank you for referring [facility] to the [facility]. I met with her in consultation today. I have ordered labs, set up a nutrition consultation with our dietician.   She is interested in lifestyle intervention, counseling peace

## (undated) NOTE — LETTER
11/8/2022        I would like to refer you Maggie Saleh. [id number]    Referring Provider: Tiffany Pitts MD    Fax: [phone]    As soon as the patient is seen please complete the form below and fax to the referring provider without a cover sheet. Exam Date _______________    Best corrected vision compared to last visit:    Unchanged               Better                       Worse                            No previous visit to compare    Retinal changes compared to last visit:    No retinopathy          Unchanged               Worse               Retinopathy needs Tx Laser/Surgery    For patients with cataracts compared to last visit:    Unchanged               Worse           No previous visit to compare        Cataracts need surgery    Other finding and diagnosis________________________________________________    Treatment recommended__________________________________________________    Return Visit_____________________________________________________________    Thank you for your professional help in treating our patient. Ophthalmologist (Print):_________________________________Signature__________________________    Address: _________________________________________________________________    Telephone: _______________________     Please provide _copies of my medical records as requested.     Patient's signature___________________________________Date_________________________

## (undated) NOTE — MR AVS SNAPSHOT
[address] [address] [phone]               Thank you for choosing us for your health care visit with Santa Mazariegos MD.  We are glad to serve you and happy to provid [address]   Phone:  [phone]         Referral Orders      Normal Orders This Visit    DERM - INTERNAL [[id number] CUSTOM]  Order #:  [id number]         **REFERRAL REQUEST**    Your physician has referred you to a specialist.  Your ph Skin lesions          Instructions and Information about Your Health     None      Allergies as of Apr 24, 2017     Codeine Phosphate                 Today's Vital Signs     BP Pulse Temp Height Weight BMI    126/82 mmHg 98 98 °F (36.7 °C) (Oral) 60\" 146 Call [phone] for help. [employer] is NOT to be used for urgent needs. For medical emergencies, dial 911.            Visit [facility] online at  [facility]

## (undated) NOTE — MR AVS SNAPSHOT
[address] [id number] [phone]               Thank you for choosing us for your health care visit with Maryjane Saleh MD.  We are glad to serve you and happy to provid Generic drug:  Fluticasone Furoate   2 sprays by Nasal route as needed. XYZAL 5 MG Tabs   Generic drug:  Levocetirizine Dihydrochloride   Take 5 mg by mouth every evening.            Zolpidem Tartrate 5 MG Tabs   Take 1 tablet (5 mg total) by mout [email or web address]. If you've recently had a stay at the Hospital you can access your discharge instructions in [employer] by going to Visits < Admission Summaries.  If you've been to the Emergency Department or your doctor's office, you can view yo Visit [facility] online at  [email or web address]

## (undated) NOTE — MR AVS SNAPSHOT
After Visit Summary   2/15/2021    Jeane Ramirez    MRN: [id number]           Visit Information     Date & Time  2/15/2021 11:00 AM Provider  [facility]  Phone  63 Pre-diabetes   [718553]    YUSEF (obstructive sleep apnea)   [514759]    Ascending aorta dilation   [805413]    Carotid artery plaque, bilateral   [4545261]    History of CVA with residual deficit   [3633086]    Essential hypertension   [963110]    Pure hype If you receive a survey from [employer], please take a few minutes to complete it and provide feedback. We strive to deliver the best patient experience and are looking for ways to make improvements. Your feedback will help us do so.  For more information EMERGENCY ROOM Life-threatening emergencies needing immediate intervention at a hospital emergency room.  Average cost  $2,300*   *Cost varies based on your insurance coverage  For more information about hours, locations or appointment options available at

## (undated) NOTE — LETTER
9/6/2023      I would like to refer you Truong Connor    Referring Provider: Renetta Murray MD    Fax: [phone]    As soon as the patient is seen please complete the form below and fax to the referring provider without a cover sheet. Exam Date _______________    Best corrected vision compared to last visit:    Unchanged               Better                       Worse                            No previous visit to compare    Retinal changes compared to last visit:    No retinopathy          Unchanged               Worse               Retinopathy needs Tx Laser/Surgery    For patients with cataracts compared to last visit:    Unchanged               Worse           No previous visit to compare        Cataracts need surgery    Other finding and diagnosis________________________________________________    Treatment recommended__________________________________________________    Return Visit_____________________________________________________________    Thank you for your professional help in treating our patient. Ophthalmologist (Print):_________________________________Signature__________________________    Address: _________________________________________________________________    Telephone: _______________________     Please provide Dr. Renetta Murray MD copies of my medical records as requested.     Patient's signature___________________________________Date_________________________

## (undated) NOTE — LETTER
2023      I would like to refer you Andres Monet  : 1955    Referring Provider: Tiffany Pitts MD    Fax: [phone]    As soon as the patient is seen please complete the form below and fax to the referring provider without a cover sheet. Exam Date _______________    Best corrected vision compared to last visit:    Unchanged               Better                       Worse                            No previous visit to compare    Retinal changes compared to last visit:    No retinopathy          Unchanged               Worse               Retinopathy needs Tx Laser/Surgery    For patients with cataracts compared to last visit:    Unchanged               Worse           No previous visit to compare        Cataracts need surgery    Other finding and diagnosis________________________________________________    Treatment recommended__________________________________________________    Return Visit_____________________________________________________________    Thank you for your professional help in treating our patient. Ophthalmologist (Print):_________________________________Signature__________________________    Address: _________________________________________________________________    Telephone: _______________________     Please provide Dr. Tiffany Pitts MD copies of my medical records as requested.     Patient's signature___________________________________Date_________________________